# Patient Record
Sex: FEMALE | Race: WHITE | NOT HISPANIC OR LATINO | Employment: OTHER | URBAN - METROPOLITAN AREA
[De-identification: names, ages, dates, MRNs, and addresses within clinical notes are randomized per-mention and may not be internally consistent; named-entity substitution may affect disease eponyms.]

---

## 2017-06-17 ENCOUNTER — RADIANT APPOINTMENT (OUTPATIENT)
Dept: GENERAL RADIOLOGY | Facility: CLINIC | Age: 82
End: 2017-06-17
Attending: PHYSICIAN ASSISTANT
Payer: COMMERCIAL

## 2017-06-17 ENCOUNTER — OFFICE VISIT (OUTPATIENT)
Dept: URGENT CARE | Facility: URGENT CARE | Age: 82
End: 2017-06-17
Payer: COMMERCIAL

## 2017-06-17 VITALS
OXYGEN SATURATION: 97 % | DIASTOLIC BLOOD PRESSURE: 70 MMHG | WEIGHT: 103 LBS | BODY MASS INDEX: 17.68 KG/M2 | SYSTOLIC BLOOD PRESSURE: 118 MMHG | HEART RATE: 82 BPM | TEMPERATURE: 98.3 F

## 2017-06-17 DIAGNOSIS — R05.9 COUGH: ICD-10-CM

## 2017-06-17 DIAGNOSIS — J20.9 ACUTE BRONCHITIS, UNSPECIFIED ORGANISM: Primary | ICD-10-CM

## 2017-06-17 DIAGNOSIS — R07.1 PAINFUL RESPIRATION: ICD-10-CM

## 2017-06-17 LAB
ALBUMIN UR-MCNC: NEGATIVE MG/DL
APPEARANCE UR: CLEAR
BASOPHILS # BLD AUTO: 0 10E9/L (ref 0–0.2)
BASOPHILS NFR BLD AUTO: 0.2 %
BILIRUB UR QL STRIP: NEGATIVE
COLOR UR AUTO: YELLOW
DIFFERENTIAL METHOD BLD: ABNORMAL
EOSINOPHIL # BLD AUTO: 0.1 10E9/L (ref 0–0.7)
EOSINOPHIL NFR BLD AUTO: 1.3 %
ERYTHROCYTE [DISTWIDTH] IN BLOOD BY AUTOMATED COUNT: 16.4 % (ref 10–15)
GLUCOSE UR STRIP-MCNC: NEGATIVE MG/DL
HCT VFR BLD AUTO: 35.7 % (ref 35–47)
HGB BLD-MCNC: 10.7 G/DL (ref 11.7–15.7)
HGB UR QL STRIP: NEGATIVE
KETONES UR STRIP-MCNC: NEGATIVE MG/DL
LEUKOCYTE ESTERASE UR QL STRIP: NEGATIVE
LYMPHOCYTES # BLD AUTO: 2.1 10E9/L (ref 0.8–5.3)
LYMPHOCYTES NFR BLD AUTO: 24.4 %
MCH RBC QN AUTO: 26.8 PG (ref 26.5–33)
MCHC RBC AUTO-ENTMCNC: 30 G/DL (ref 31.5–36.5)
MCV RBC AUTO: 89 FL (ref 78–100)
MONOCYTES # BLD AUTO: 0.9 10E9/L (ref 0–1.3)
MONOCYTES NFR BLD AUTO: 10.9 %
NEUTROPHILS # BLD AUTO: 5.5 10E9/L (ref 1.6–8.3)
NEUTROPHILS NFR BLD AUTO: 63.2 %
NITRATE UR QL: NEGATIVE
NON-SQ EPI CELLS #/AREA URNS LPF: NORMAL /LPF
PH UR STRIP: 5.5 PH (ref 5–7)
PLATELET # BLD AUTO: 440 10E9/L (ref 150–450)
RADIOLOGIST FLAGS: ABNORMAL
RBC # BLD AUTO: 4 10E12/L (ref 3.8–5.2)
RBC #/AREA URNS AUTO: NORMAL /HPF (ref 0–2)
SP GR UR STRIP: 1.02 (ref 1–1.03)
URN SPEC COLLECT METH UR: NORMAL
UROBILINOGEN UR STRIP-ACNC: 0.2 EU/DL (ref 0.2–1)
WBC # BLD AUTO: 8.7 10E9/L (ref 4–11)
WBC #/AREA URNS AUTO: NORMAL /HPF (ref 0–2)

## 2017-06-17 PROCEDURE — 93000 ELECTROCARDIOGRAM COMPLETE: CPT | Performed by: PHYSICIAN ASSISTANT

## 2017-06-17 PROCEDURE — 36415 COLL VENOUS BLD VENIPUNCTURE: CPT | Performed by: PHYSICIAN ASSISTANT

## 2017-06-17 PROCEDURE — 99214 OFFICE O/P EST MOD 30 MIN: CPT | Performed by: PHYSICIAN ASSISTANT

## 2017-06-17 PROCEDURE — 81001 URINALYSIS AUTO W/SCOPE: CPT | Performed by: PHYSICIAN ASSISTANT

## 2017-06-17 PROCEDURE — 85025 COMPLETE CBC W/AUTO DIFF WBC: CPT | Performed by: PHYSICIAN ASSISTANT

## 2017-06-17 PROCEDURE — 71020 XR CHEST 2 VW: CPT

## 2017-06-17 RX ORDER — DOXYCYCLINE 100 MG/1
100 CAPSULE ORAL 2 TIMES DAILY
Qty: 20 CAPSULE | Refills: 0 | Status: SHIPPED | OUTPATIENT
Start: 2017-06-17 | End: 2018-10-16

## 2017-06-17 RX ORDER — BENZONATATE 200 MG/1
200 CAPSULE ORAL 3 TIMES DAILY PRN
Qty: 21 CAPSULE | Refills: 0 | Status: SHIPPED | OUTPATIENT
Start: 2017-06-17 | End: 2017-06-24

## 2017-06-17 NOTE — PROGRESS NOTES
SUBJECTIVE:  Courtney Howe is a 82 year old female who presents to the clinic today with a chief complaint of cough  and pleuritic chest pain for 4 day(s).  Her cough is described as occasional and nonproductive.    The patient's symptoms are moderate and worsening.  Associated symptoms include pleuritic chest pain and fever. The patient's symptoms are exacerbated by lying down and change of position.  Patient has been using Tylenol and Advil 400mg to improve symptoms.    No dizziness, no shortness of breath. No syncope or presyncope. No diaphoresis, nausea vomiting, left arm or jaw pain; no sustained chest pain. No other cardiac symptoms to report    Past Medical History:   Diagnosis Date     Cancer (H) skin cancer    removed     CARDIOVASCULAR SCREENING; LDL GOAL LESS THAN 160 10/31/2010     Celiac disease 9/8/2005     INSOMNIA NEC 9/8/2005     OSTEOPOROSIS NOS 9/8/2005     PNEUMONIA, ORGANISM NOS 12/5/2007       Current Outpatient Prescriptions   Medication Sig Dispense Refill     alendronate (FOSAMAX) 70 MG tablet Take 1 tablet (70 mg) by mouth every 7 days Take with over 8 ounces water and stay upright for at least 30 minutes after dose.  Take at least 60 minutes before breakfast 12 tablet 3     amitriptyline (ELAVIL) 25 MG tablet Take 1 tablet (25 mg) by mouth At Bedtime at bedtime. 90 tablet 3     MULTIVITAL OR TABS 1 TABLET DAILY       ASPIRIN 81 MG OR TABS 1 TABLET DAILY       CALCIUM + D 600-200 MG-UNIT OR TABS 1 TABLET DAILY       Ondansetron HCl (ZOFRAN PO) Take 4 mg by mouth         Social History   Substance Use Topics     Smoking status: Never Smoker     Smokeless tobacco: Never Used     Alcohol use Yes      Comment: social       ROS  Review of systems negative except as stated above.    OBJECTIVE:  /70 (BP Location: Left arm, Patient Position: Chair, Cuff Size: Adult Regular)  Pulse 82  Temp 98.3  F (36.8  C) (Oral)  Wt 103 lb (46.7 kg)  SpO2 97%  BMI 17.68 kg/m2  GENERAL APPEARANCE:  healthy, alert and no distress  EYES: EOMI,  PERRL, conjunctiva clear  HENT: ear canals and TM's normal.  Nose and mouth without ulcers, erythema or lesions  NECK: supple, nontender, no lymphadenopathy  RESP: lungs clear to auscultation - no rales, rhonchi or wheezes  CV: regular rates and rhythm, normal S1 S2, no murmur noted  ABDOMEN:  soft, nontender, no HSM or masses and bowel sounds normal  NEURO: Normal strength and tone, sensory exam grossly normal,  normal speech and mentation  SKIN: no suspicious lesions or rashes    Lab:  Results for orders placed or performed in visit on 06/17/17   XR Chest 2 Views   Result Value Ref Range    Radiologist flags (Urgent)      Opacity in the left mid lung that requires continued    Narrative    CHEST TWO VIEW 6/17/2017 4:07 PM     COMPARISON: None    HISTORY: Cough      Impression    IMPRESSION: There are emphysematous and fibrotic changes throughout  both lungs. There is subtle patchy opacity in the mid left lung that  may represent atelectasis or pneumonia although malignancy cannot be  excluded. Continued surveillance recommended. The lungs are otherwise  clear. There is no pleural effusion or pneumothorax. Heart size is  normal with no evidence for congestive failure.    [Access Center: Opacity in the left mid lung that requires continued  surveillance to exclude malignancy]    This report will be copied to the [facility] to ensure a  provider acknowledges the finding. [facility] is available Monday  through Friday 8am-3:30 pm.     MARISA KAISER MD   CBC with platelets differential   Result Value Ref Range    WBC 8.7 4.0 - 11.0 10e9/L    RBC Count 4.00 3.8 - 5.2 10e12/L    Hemoglobin 10.7 (L) 11.7 - 15.7 g/dL    Hematocrit 35.7 35.0 - 47.0 %    MCV 89 78 - 100 fl    MCH 26.8 26.5 - 33.0 pg    MCHC 30.0 (L) 31.5 - 36.5 g/dL    RDW 16.4 (H) 10.0 - 15.0 %    Platelet Count 440 150 - 450 10e9/L    Diff Method Automated Method     % Neutrophils 63.2 %    %  Lymphocytes 24.4 %    % Monocytes 10.9 %    % Eosinophils 1.3 %    % Basophils 0.2 %    Absolute Neutrophil 5.5 1.6 - 8.3 10e9/L    Absolute Lymphocytes 2.1 0.8 - 5.3 10e9/L    Absolute Monocytes 0.9 0.0 - 1.3 10e9/L    Absolute Eosinophils 0.1 0.0 - 0.7 10e9/L    Absolute Basophils 0.0 0.0 - 0.2 10e9/L   UA with Microscopic   Result Value Ref Range    Color Urine Yellow     Appearance Urine Clear     Glucose Urine Negative NEG mg/dL    Bilirubin Urine Negative NEG    Ketones Urine Negative NEG mg/dL    Specific Gravity Urine 1.020 1.003 - 1.035    pH Urine 5.5 5.0 - 7.0 pH    Protein Albumin Urine Negative NEG mg/dL    Urobilinogen Urine 0.2 0.2 - 1.0 EU/dL    Nitrite Urine Negative NEG    Blood Urine Negative NEG    Leukocyte Esterase Urine Negative NEG    Source Midstream Urine     WBC Urine O - 2 0 - 2 /HPF    RBC Urine O - 2 0 - 2 /HPF    Squamous Epithelial /LPF Urine Few FEW /LPF     EKG:  EKG is with normal sinus rhythm, normal GA, QRS interval, normal axis and transition with transition at lead V4.  PAC noted in lead II Summary is a normal EKG.  Cardiologist will over-read EKG.      ASSESSMENT:    Pneumonitis  Questionable finding on Chest xray requiring future monitoring    PLAN:    Symptomatic measures encouraged, humidified air, plenty of fluids.  Antibiotic as written below for atypical coverage.  Will recheck with primary care provider on Tuesday or Wednesday of next week to follow up on progress of antibiotic coverage and symptoms and review of radiology finding and to schedule follow up monitoring of radiology findings on lung extra.    - doxycycline (VIBRAMYCIN) 100 MG capsule; Take 1 capsule (100 mg) by mouth 2 times daily  Dispense: 20 capsule; Refill: 0    - benzonatate (TESSALON) 200 MG capsule; Take 1 capsule (200 mg) by mouth 3 times daily as needed for cough  Dispense: 21 capsule; Refill: 0

## 2017-06-17 NOTE — NURSING NOTE
"Chief Complaint   Patient presents with     Cough     cough for 10 days,chest pain with deep resp and moving around,had chills last night     Chest Pain       Initial /70 (BP Location: Left arm, Patient Position: Chair, Cuff Size: Adult Regular)  Pulse 82  Temp 98.3  F (36.8  C) (Oral)  Wt 103 lb (46.7 kg)  SpO2 97%  BMI 17.68 kg/m2 Estimated body mass index is 17.68 kg/(m^2) as calculated from the following:    Height as of 10/12/16: 5' 4\" (1.626 m).    Weight as of this encounter: 103 lb (46.7 kg).  Medication Reconciliation: complete   Edson FISCHER    "

## 2017-06-17 NOTE — MR AVS SNAPSHOT
After Visit Summary   6/17/2017    Courtney Howe    MRN: [id number]           Patient Information     Date Of Birth          8/27/1934        Visit Information        Provider Department      6/17/2017 3:20 PM Attila Samuel PA-C [facility]        Today's Diagnoses     Acute bronchitis, unspecified organism    -  1    Cough        Painful respiration           Follow-ups after your visit        Your next 10 appointments already scheduled     Jun 22, 2017  7:40 AM CDT   MyChart Short with Bassem Howe MD   [facility]    [address] [address] [phone]              Who to contact     If you have questions or need follow up information about today's clinic visit or your schedule please contact [facility] directly at [phone].  Normal or non-critical lab and imaging results will be communicated to you by MyChart, letter or phone within 4 business days after the clinic has received the results. If you do not hear from us within 7 days, please contact the clinic through [employer] or phone. If you have a critical or abnormal lab result, we will notify you by phone as soon as possible.  Submit refill requests through [employer] or call your pharmacy and they will forward the refill request to us. Please allow 3 business days for your refill to be completed.          Additional Information About Your Visit        MyChart Information     [employer] gives you secure access to your electronic health record. If you see a primary care provider, you can also send messages to your care team and make appointments. If you have questions, please call your primary care clinic.  If you do not have a primary care provider, please call [phone] and they will assist you.        Care EveryWhere ID     This is your Care EveryWhere ID. This could be used by other  organizations to access your [place] medical records  [id number]        Your Vitals Were     Pulse Temperature Pulse Oximetry BMI (Body Mass Index)          82 98.3  F (36.8  C) (Oral) 97% 17.68 kg/m2         Blood Pressure from Last 3 Encounters:   06/17/17 118/70   10/12/16 112/68   09/10/15 96/58    Weight from Last 3 Encounters:   06/17/17 103 lb (46.7 kg)   10/12/16 100 lb 11.2 oz (45.7 kg)   09/10/15 94 lb 8 oz (42.9 kg)              We Performed the Following     CBC with platelets differential     EKG 12-lead complete w/read - Clinics     UA with Microscopic     XR Chest 2 Views          Today's Medication Changes          These changes are accurate as of: 6/17/17  5:59 PM.  If you have any questions, ask your nurse or doctor.               Start taking these medicines.        Dose/Directions    benzonatate 200 MG capsule   Commonly known as:  TESSALON   Used for:  Acute bronchitis, unspecified organism   Started by:  Attila Samuel PA-C        Dose:  200 mg   Take 1 capsule (200 mg) by mouth 3 times daily as needed for cough   Quantity:  21 capsule   Refills:  0       doxycycline 100 MG capsule   Commonly known as:  VIBRAMYCIN   Used for:  Painful respiration   Started by:  Attila Samuel PA-C        Dose:  100 mg   Take 1 capsule (100 mg) by mouth 2 times daily   Quantity:  20 capsule   Refills:  0            Where to get your medicines      These medications were sent to [id number] - [facility] [address]  [address] [facility] [address]     Phone:  [phone]     benzonatate 200 MG capsule    doxycycline 100 MG capsule                Primary Care Provider Office Phone # Fax #    Bassem Howe [phone]       [facility] [address]  [facility] [address]        Thank you!     Thank you for choosing [facility]  for your care. Our goal is always to provide you with excellent care. Hearing back  from our patients is one way we can continue to improve our services. Please take a few minutes to complete the written survey that you may receive in the mail after your visit with us. Thank you!             Your Updated Medication List - Protect others around you: Learn how to safely use, store and throw away your medicines at www.disposemymeds.org.          This list is accurate as of: 6/17/17  5:59 PM.  Always use your most recent med list.                   Brand Name Dispense Instructions for use    alendronate 70 MG tablet    FOSAMAX    12 tablet    Take 1 tablet (70 mg) by mouth every 7 days Take with over 8 ounces water and stay upright for at least 30 minutes after dose.  Take at least 60 minutes before breakfast       amitriptyline 25 MG tablet    ELAVIL    90 tablet    Take 1 tablet (25 mg) by mouth At Bedtime at bedtime.       aspirin 81 MG tablet      1 TABLET DAILY       benzonatate 200 MG capsule    TESSALON    21 capsule    Take 1 capsule (200 mg) by mouth 3 times daily as needed for cough       calcium + D 600-200 MG-UNIT Tabs   Generic drug:  calcium carbonate-vitamin D      1 TABLET DAILY       doxycycline 100 MG capsule    VIBRAMYCIN    20 capsule    Take 1 capsule (100 mg) by mouth 2 times daily       MULTIVITAL Tabs      1 TABLET DAILY       ZOFRAN PO      Take 4 mg by mouth

## 2017-06-22 ENCOUNTER — OFFICE VISIT (OUTPATIENT)
Dept: INTERNAL MEDICINE | Facility: CLINIC | Age: 82
End: 2017-06-22
Payer: COMMERCIAL

## 2017-06-22 VITALS
WEIGHT: 103.6 LBS | DIASTOLIC BLOOD PRESSURE: 68 MMHG | BODY MASS INDEX: 17.69 KG/M2 | TEMPERATURE: 98 F | HEIGHT: 64 IN | OXYGEN SATURATION: 99 % | SYSTOLIC BLOOD PRESSURE: 108 MMHG | HEART RATE: 80 BPM

## 2017-06-22 DIAGNOSIS — J98.4 PNEUMONITIS: Primary | ICD-10-CM

## 2017-06-22 PROCEDURE — 99213 OFFICE O/P EST LOW 20 MIN: CPT | Performed by: INTERNAL MEDICINE

## 2017-06-22 NOTE — MR AVS SNAPSHOT
After Visit Summary   6/22/2017    Courtney Howe    MRN: [id number]           Patient Information     Date Of Birth          8/27/1934        Visit Information        Provider Department      6/22/2017 7:40 AM Bassem Howe MD [facility]        Today's Diagnoses     Pneumonitis    -  1       Follow-ups after your visit        Future tests that were ordered for you today     Open Future Orders        Priority Expected Expires Ordered    XR Chest 2 Views Routine 7/4/2017 7/31/2017 6/22/2017            Who to contact     If you have questions or need follow up information about today's clinic visit or your schedule please contact [facility] directly at [phone].  Normal or non-critical lab and imaging results will be communicated to you by MyChart, letter or phone within 4 business days after the clinic has received the results. If you do not hear from us within 7 days, please contact the clinic through [employer] or phone. If you have a critical or abnormal lab result, we will notify you by phone as soon as possible.  Submit refill requests through [employer] or call your pharmacy and they will forward the refill request to us. Please allow 3 business days for your refill to be completed.          Additional Information About Your Visit        MyChart Information     [employer] gives you secure access to your electronic health record. If you see a primary care provider, you can also send messages to your care team and make appointments. If you have questions, please call your primary care clinic.  If you do not have a primary care provider, please call [phone] and they will assist you.        Care EveryWhere ID     This is your Care EveryWhere ID. This could be used by other organizations to access your [place] medical records  [id number]        Your Vitals Were     Pulse Temperature Height Pulse Oximetry BMI (Body Mass Index)       80 98  F  "(36.7  C) (Oral) 5' 4\" (1.626 m) 99% 17.78 kg/m2        Blood Pressure from Last 3 Encounters:   06/22/17 108/68   06/17/17 118/70   10/12/16 112/68    Weight from Last 3 Encounters:   06/22/17 103 lb 9.6 oz (47 kg)   06/17/17 103 lb (46.7 kg)   10/12/16 100 lb 11.2 oz (45.7 kg)               Primary Care Provider Office Phone # Fax #    Bassem Howe [phone]       [facility] [address] [facility] [address]        Equal Access to Services     MARY HAUSER : Hadii jessi sorto hadasho Soomaali, waaxda luqadaha, qaybta kaalmada adeegyada, waxmagnolia weldon. So [facility] [phone].    ATENCIÓN: Si habla español, tiene a beard disposición servicios gratuitos de asistencia lingüística. Ll[facility] [phone].    We comply with applicable federal civil rights laws and [place] laws. We do not discriminate on the basis of race, color, national origin, age, disability sex, sexual orientation or gender identity.            Thank you!     Thank you for choosing [facility]  for your care. Our goal is always to provide you with excellent care. Hearing back from our patients is one way we can continue to improve our services. Please take a few minutes to complete the written survey that you may receive in the mail after your visit with us. Thank you!             Your Updated Medication List - Protect others around you: Learn how to safely use, store and throw away your medicines at www.disposemymeds.org.          This list is accurate as of: 6/22/17  7:54 AM.  Always use your most recent med list.                   Brand Name Dispense Instructions for use Diagnosis    alendronate 70 MG tablet    FOSAMAX    12 tablet    Take 1 tablet (70 mg) by mouth every 7 days Take with over 8 ounces water and stay upright for at least 30 minutes after dose.  Take at least 60 minutes before breakfast    Osteoporosis       amitriptyline 25 MG tablet    ELAVIL    " 90 tablet    Take 1 tablet (25 mg) by mouth At Bedtime at bedtime.    Insomnia, unspecified       aspirin 81 MG tablet      1 TABLET DAILY        benzonatate 200 MG capsule    TESSALON    21 capsule    Take 1 capsule (200 mg) by mouth 3 times daily as needed for cough    Acute bronchitis, unspecified organism       calcium + D 600-200 MG-UNIT Tabs   Generic drug:  calcium carbonate-vitamin D      1 TABLET DAILY        doxycycline 100 MG capsule    VIBRAMYCIN    20 capsule    Take 1 capsule (100 mg) by mouth 2 times daily    Painful respiration       MULTIVITAL Tabs      1 TABLET DAILY

## 2017-06-22 NOTE — NURSING NOTE
"Chief Complaint   Patient presents with     Cough       Initial /68  Pulse 80  Temp 98  F (36.7  C) (Oral)  Ht 5' 4\" (1.626 m)  Wt 103 lb 9.6 oz (47 kg)  SpO2 99%  BMI 17.78 kg/m2 Estimated body mass index is 17.78 kg/(m^2) as calculated from the following:    Height as of this encounter: 5' 4\" (1.626 m).    Weight as of this encounter: 103 lb 9.6 oz (47 kg).  Medication Reconciliation: complete   Shakila Billy, RENETTA      "

## 2017-06-22 NOTE — PROGRESS NOTES
SUBJECTIVE:                                                    Courtney Howe is a 82 year old female who presents to clinic today for the following health issues:    ED/UC Followup:    Facility:  [facility]   Date of visit: 6/17/17  Reason for visit: Acute bronchitis, cough  Current Status: Improving, still has dry cough.      Reviewed results with patient as per CXR.  On abx,feeling better but not fully with 5+ days of abx left.  No fevers.    Problem list and histories reviewed & adjusted, as indicated.  Additional history: as documented    Patient Active Problem List   Diagnosis     Osteoporosis     Celiac disease     Insomnia     Pneumonia, organism unspecified     CARDIOVASCULAR SCREENING; LDL GOAL LESS THAN 160     Advance care planning     Palpitations     C. difficile diarrhea     Past Surgical History:   Procedure Laterality Date     BIOPSY       BREAST SURGERY       COLONOSCOPY       GI SURGERY       HYSTERECTOMY, CERVIX STATUS UNKNOWN       HYSTERECTOMY, PAP NO LONGER INDICATED         Social History   Substance Use Topics     Smoking status: Never Smoker     Smokeless tobacco: Never Used     Alcohol use Yes      Comment: social     Family History   Problem Relation Age of Onset     CEREBROVASCULAR DISEASE Father      OSTEOPOROSIS Mother          Current Outpatient Prescriptions   Medication Sig Dispense Refill     doxycycline (VIBRAMYCIN) 100 MG capsule Take 1 capsule (100 mg) by mouth 2 times daily 20 capsule 0     benzonatate (TESSALON) 200 MG capsule Take 1 capsule (200 mg) by mouth 3 times daily as needed for cough 21 capsule 0     alendronate (FOSAMAX) 70 MG tablet Take 1 tablet (70 mg) by mouth every 7 days Take with over 8 ounces water and stay upright for at least 30 minutes after dose.  Take at least 60 minutes before breakfast 12 tablet 3     amitriptyline (ELAVIL) 25 MG tablet Take 1 tablet (25 mg) by mouth At Bedtime at bedtime. 90 tablet 3     Ondansetron HCl (ZOFRAN PO) Take 4 mg by  "mouth       MULTIVITAL OR TABS 1 TABLET DAILY       ASPIRIN 81 MG OR TABS 1 TABLET DAILY       CALCIUM + D 600-200 MG-UNIT OR TABS 1 TABLET DAILY       Allergies   Allergen Reactions     Wheat Gluten [Gluten Meal]      BP Readings from Last 3 Encounters:   06/17/17 118/70   10/12/16 112/68   09/10/15 96/58    Wt Readings from Last 3 Encounters:   06/17/17 103 lb (46.7 kg)   10/12/16 100 lb 11.2 oz (45.7 kg)   09/10/15 94 lb 8 oz (42.9 kg)            Labs reviewed in EPIC    Reviewed and updated as needed this visit by clinical staff       Reviewed and updated as needed this visit by Provider         ROS:  C: NEGATIVE for fever, chills, change in weight  E/M: NEGATIVE for ear, mouth and throat problems  CV: NEGATIVE for chest pain, palpitations or peripheral edema  GI: NEGATIVE for nausea, abdominal pain, heartburn, or change in bowel habits  : NEGATIVE for frequency, dysuria, or hematuria  M: NEGATIVE for significant arthralgias or myalgia  H: NEGATIVE for bleeding problems  P: NEGATIVE for changes in mood or affect    OBJECTIVE:                                                    /68  Pulse 80  Temp 98  F (36.7  C) (Oral)  Ht 5' 4\" (1.626 m)  Wt 103 lb 9.6 oz (47 kg)  SpO2 99%  BMI 17.78 kg/m2  Body mass index is 17.78 kg/(m^2).  GENERAL: alert and no distress  RESP: lungs clear to auscultation - no rales, no rhonchi, no wheezes  CV: regular rates and rhythm, normal S1 S2, no S3 or S4 and no click or rub -  MS: extremities- no gross deformities noted  NEURO: no focal changes  PSYCH: Alert and oriented times 3; speech- coherent , normal rate and volume; able to articulate logical thoughts, able to abstract reason, no tangential thoughts, no hallucinations or delusions, affect- normal       ASSESSMENT/PLAN:                                                      (J18.9) Pneumonitis  (primary encounter diagnosis)  Comment: complete abx and repeat CXR after 7/4/17, consider CT imaging pending results  Plan: " XR Chest 2 Views          See Patient Instructions    Bassem Howe MD  [facility]    15 minutes spent with this patient, face to face, discussing treatment options for listed problems above as well as side effects of appropriate medications.  Counseling time extended beyond 50% of the clinic visit.  Medication dosing, treatment plan and follow-up were discussed. Also reviewed need for primary care testing for patient.

## 2017-07-11 ENCOUNTER — MYC MEDICAL ADVICE (OUTPATIENT)
Dept: INTERNAL MEDICINE | Facility: CLINIC | Age: 82
End: 2017-07-11

## 2017-07-11 ENCOUNTER — RADIANT APPOINTMENT (OUTPATIENT)
Dept: GENERAL RADIOLOGY | Facility: CLINIC | Age: 82
End: 2017-07-11
Attending: INTERNAL MEDICINE
Payer: COMMERCIAL

## 2017-07-11 DIAGNOSIS — J98.4 PNEUMONITIS: Primary | ICD-10-CM

## 2017-07-11 DIAGNOSIS — J98.4 PNEUMONITIS: ICD-10-CM

## 2017-07-11 PROCEDURE — 71020 XR CHEST 2 VW: CPT

## 2017-07-13 ENCOUNTER — MYC MEDICAL ADVICE (OUTPATIENT)
Dept: INTERNAL MEDICINE | Facility: CLINIC | Age: 82
End: 2017-07-13

## 2017-07-13 DIAGNOSIS — Z78.0 ASYMPTOMATIC POSTMENOPAUSAL STATUS: Primary | ICD-10-CM

## 2017-08-30 ENCOUNTER — RADIANT APPOINTMENT (OUTPATIENT)
Dept: GENERAL RADIOLOGY | Facility: CLINIC | Age: 82
End: 2017-08-30
Attending: INTERNAL MEDICINE
Payer: COMMERCIAL

## 2017-08-30 ENCOUNTER — RADIANT APPOINTMENT (OUTPATIENT)
Dept: MAMMOGRAPHY | Facility: CLINIC | Age: 82
End: 2017-08-30
Attending: INTERNAL MEDICINE
Payer: COMMERCIAL

## 2017-08-30 ENCOUNTER — RADIANT APPOINTMENT (OUTPATIENT)
Dept: BONE DENSITY | Facility: CLINIC | Age: 82
End: 2017-08-30
Attending: INTERNAL MEDICINE
Payer: COMMERCIAL

## 2017-08-30 DIAGNOSIS — Z12.31 VISIT FOR SCREENING MAMMOGRAM: ICD-10-CM

## 2017-08-30 DIAGNOSIS — Z78.0 ASYMPTOMATIC POSTMENOPAUSAL STATUS: ICD-10-CM

## 2017-08-30 PROCEDURE — G0202 SCR MAMMO BI INCL CAD: HCPCS | Mod: TC

## 2017-08-30 PROCEDURE — 77085 DXA BONE DENSITY AXL VRT FX: CPT | Performed by: INTERNAL MEDICINE

## 2017-08-30 PROCEDURE — 71020 XR CHEST 2 VW: CPT

## 2017-09-13 ENCOUNTER — MYC MEDICAL ADVICE (OUTPATIENT)
Dept: INTERNAL MEDICINE | Facility: CLINIC | Age: 82
End: 2017-09-13

## 2017-09-23 ENCOUNTER — HEALTH MAINTENANCE LETTER (OUTPATIENT)
Age: 82
End: 2017-09-23

## 2017-09-27 DIAGNOSIS — M81.0 OSTEOPOROSIS: ICD-10-CM

## 2017-09-27 NOTE — TELEPHONE ENCOUNTER
alendronate (FOSAMAX) 70 MG tablet    Last Written Prescription Date: 10/12/2016  Last Fill Quantity: 12, # refills: 3  Last Office Visit with G, [facility] or [facility] prescribing provider: 6/22/2017       DEXA Scan:  Last order of DX HIP/PELVIS/SPINE was found on 7/13/2011 from Orders Only on 7/13/2011     Last order of DX HIP/PELVIS/SPINE W LAT FRACTION ANALYSIS was found on 8/30/2017 from Radiant Appointment on 8/30/2017       Creatinine   Date Value Ref Range Status   12/07/2016 0.54 0.52 - 1.04 mg/dL Final

## 2017-09-28 RX ORDER — ALENDRONATE SODIUM 70 MG/1
TABLET ORAL
Qty: 12 TABLET | Refills: 2 | Status: SHIPPED | OUTPATIENT
Start: 2017-09-28 | End: 2018-06-13

## 2017-11-07 DIAGNOSIS — G47.00 INSOMNIA: ICD-10-CM

## 2018-03-19 ENCOUNTER — TRANSFERRED RECORDS (OUTPATIENT)
Dept: HEALTH INFORMATION MANAGEMENT | Facility: CLINIC | Age: 83
End: 2018-03-19

## 2018-06-13 PROBLEM — M81.0 OSTEOPOROSIS, UNSPECIFIED OSTEOPOROSIS TYPE, UNSPECIFIED PATHOLOGICAL FRACTURE PRESENCE: Status: ACTIVE | Noted: 2018-06-13

## 2018-09-12 DIAGNOSIS — G47.00 INSOMNIA: ICD-10-CM

## 2018-09-12 DIAGNOSIS — M81.0 OSTEOPOROSIS, UNSPECIFIED OSTEOPOROSIS TYPE, UNSPECIFIED PATHOLOGICAL FRACTURE PRESENCE: ICD-10-CM

## 2018-09-12 NOTE — LETTER
[facility]  [address] [id number] [phone]            Courtney Howe  [address]  [facility] [address]        September 13, 2018    Dear Courtney,    While refilling your prescription today, we noticed that you are due for an appointment with your provider.  We will refill your prescription for 30 days, but a follow-up appointment must be made before any additional refills can be approved.     Taking care of your health is important to us and we look forward to seeing you in the near future.  Please call us at [phone] or [phone] (or use "[employer]") to schedule an appointment.     Please disregard this notice if you have already made an appointment.    Sincerely,        [facility]

## 2018-09-12 NOTE — TELEPHONE ENCOUNTER
"Requested Prescriptions   Pending Prescriptions Disp Refills     alendronate (FOSAMAX) 70 MG tablet [Pharmacy Med Name: Alendronate Sodium Oral Tablet 70 MG]  Last Written Prescription Date:  06/13/2018  Last Fill Quantity: 12,  # refills:0    Last Office Visit: 6/22/2017   Future Office Visit:      12 tablet 0     Sig: TAKE 1 TABLET BY MOUTH EVERY 7 DAYS. TAKE WITH OVER 8 OUNCES OF WATER AND STAY UPRIGHT FOR AT LEAST 30 MINUTES AFTER DOSE. TAKE AT LEAST 60    Bisphosphonates Failed    9/12/2018  4:19 PM       Failed - Recent (12 mo) or future (30 days) visit within the authorizing provider's specialty    Patient had office visit in the last 12 months or has a visit in the next 30 days with authorizing provider or within the authorizing provider's specialty.  See \"Patient Info\" tab in inbasket, or \"Choose Columns\" in Meds & Orders section of the refill encounter.           Failed - Normal serum creatinine on file within past 12 months    Recent Labs   Lab Test  12/07/16   1657   CR  0.54            Passed - Dexa on file within past 2 years    Please review last Dexa result.          Passed - Patient is age 18 or older        amitriptyline (ELAVIL) 25 MG tablet [Pharmacy Med Name: Amitriptyline HCl Oral Tablet 25 MG]  Last Written Prescription Date:  11/08/2017  Last Fill Quantity: 90,  # refills: 01   Last Office Visit: 6/22/2017   Future Office Visit:      90 tablet 0     Sig: TAKE ONE TABLET BY MOUTH AT BEDTIME    Tricyclic Agents ( Annual appt and no PHQ9) Failed    9/12/2018  4:19 PM       Failed - Blood Pressure under 140/90 in past 12 mos    BP Readings from Last 3 Encounters:   06/22/17 108/68   06/17/17 118/70   10/12/16 112/68                Failed - Recent (12 mo) or future (30 days) visit within authorizing provider's specialty    Patient had office visit in the last 12 months or has a visit in the next 30 days with authorizing provider or within the authorizing provider's specialty.  See \"Patient Info\" " "tab in inbasket, or \"Choose Columns\" in Meds & Orders section of the refill encounter.           Passed - Patient is age 18 or older       Passed - Patient is not pregnant       Passed - No positive pregnancy test on record in past 12 mos          "

## 2018-09-13 RX ORDER — ALENDRONATE SODIUM 70 MG/1
TABLET ORAL
Qty: 4 TABLET | Refills: 0 | Status: SHIPPED | OUTPATIENT
Start: 2018-09-13 | End: 2018-10-16

## 2018-09-25 ENCOUNTER — TRANSFERRED RECORDS (OUTPATIENT)
Dept: HEALTH INFORMATION MANAGEMENT | Facility: CLINIC | Age: 83
End: 2018-09-25

## 2018-10-16 ENCOUNTER — OFFICE VISIT (OUTPATIENT)
Dept: INTERNAL MEDICINE | Facility: CLINIC | Age: 83
End: 2018-10-16
Payer: COMMERCIAL

## 2018-10-16 VITALS
HEIGHT: 64 IN | SYSTOLIC BLOOD PRESSURE: 112 MMHG | BODY MASS INDEX: 17.79 KG/M2 | RESPIRATION RATE: 14 BRPM | HEART RATE: 78 BPM | OXYGEN SATURATION: 99 % | TEMPERATURE: 97.6 F | DIASTOLIC BLOOD PRESSURE: 68 MMHG | WEIGHT: 104.2 LBS

## 2018-10-16 DIAGNOSIS — G47.00 INSOMNIA, UNSPECIFIED TYPE: ICD-10-CM

## 2018-10-16 DIAGNOSIS — Z23 NEED FOR PROPHYLACTIC VACCINATION AND INOCULATION AGAINST INFLUENZA: ICD-10-CM

## 2018-10-16 DIAGNOSIS — Z23 NEED FOR PROPHYLACTIC VACCINATION WITH TETANUS-DIPHTHERIA (TD): ICD-10-CM

## 2018-10-16 DIAGNOSIS — M81.0 OSTEOPOROSIS, UNSPECIFIED OSTEOPOROSIS TYPE, UNSPECIFIED PATHOLOGICAL FRACTURE PRESENCE: Primary | ICD-10-CM

## 2018-10-16 LAB
ALBUMIN SERPL-MCNC: 3.1 G/DL (ref 3.4–5)
ALP SERPL-CCNC: 71 U/L (ref 40–150)
ALT SERPL W P-5'-P-CCNC: 26 U/L (ref 0–50)
ANION GAP SERPL CALCULATED.3IONS-SCNC: 8 MMOL/L (ref 3–14)
AST SERPL W P-5'-P-CCNC: 32 U/L (ref 0–45)
BILIRUB SERPL-MCNC: 0.4 MG/DL (ref 0.2–1.3)
BUN SERPL-MCNC: 8 MG/DL (ref 7–30)
CALCIUM SERPL-MCNC: 8.9 MG/DL (ref 8.5–10.1)
CHLORIDE SERPL-SCNC: 106 MMOL/L (ref 94–109)
CO2 SERPL-SCNC: 25 MMOL/L (ref 20–32)
CREAT SERPL-MCNC: 0.5 MG/DL (ref 0.52–1.04)
GFR SERPL CREATININE-BSD FRML MDRD: >90 ML/MIN/1.7M2
GLUCOSE SERPL-MCNC: 86 MG/DL (ref 70–99)
POTASSIUM SERPL-SCNC: 4 MMOL/L (ref 3.4–5.3)
PROT SERPL-MCNC: 7.1 G/DL (ref 6.8–8.8)
SODIUM SERPL-SCNC: 139 MMOL/L (ref 133–144)

## 2018-10-16 PROCEDURE — 80053 COMPREHEN METABOLIC PANEL: CPT | Performed by: INTERNAL MEDICINE

## 2018-10-16 PROCEDURE — 36415 COLL VENOUS BLD VENIPUNCTURE: CPT | Performed by: INTERNAL MEDICINE

## 2018-10-16 PROCEDURE — 99214 OFFICE O/P EST MOD 30 MIN: CPT | Mod: 25 | Performed by: INTERNAL MEDICINE

## 2018-10-16 PROCEDURE — 90471 IMMUNIZATION ADMIN: CPT | Performed by: INTERNAL MEDICINE

## 2018-10-16 PROCEDURE — 90714 TD VACC NO PRESV 7 YRS+ IM: CPT | Performed by: INTERNAL MEDICINE

## 2018-10-16 RX ORDER — ALENDRONATE SODIUM 70 MG/1
70 TABLET ORAL
Qty: 12 TABLET | Refills: 3 | Status: SHIPPED | OUTPATIENT
Start: 2018-10-16 | End: 2019-09-09

## 2018-10-16 NOTE — MR AVS SNAPSHOT
After Visit Summary   10/16/2018    Courtney Howe    MRN: [id number]           Patient Information     Date Of Birth          8/27/1934        Visit Information        Provider Department      10/16/2018 8:40 AM Bassem Howe MD [facility]        Today's Diagnoses     Osteoporosis, unspecified osteoporosis type, unspecified pathological fracture presence    -  1    Insomnia, unspecified type        Need for prophylactic vaccination and inoculation against influenza        Need for prophylactic vaccination with tetanus-diphtheria (Td)           Follow-ups after your visit        Who to contact     If you have questions or need follow up information about today's clinic visit or your schedule please contact [facility] directly at [phone].  Normal or non-critical lab and imaging results will be communicated to you by MyChart, letter or phone within 4 business days after the clinic has received the results. If you do not hear from us within 7 days, please contact the clinic through [facility] or phone. If you have a critical or abnormal lab result, we will notify you by phone as soon as possible.  Submit refill requests through [employer] or call your pharmacy and they will forward the refill request to us. Please allow 3 business days for your refill to be completed.          Additional Information About Your Visit        MyChart Information     [employer] gives you secure access to your electronic health record. If you see a primary care provider, you can also send messages to your care team and make appointments. If you have questions, please call your primary care clinic.  If you do not have a primary care provider, please call [phone] and they will assist you.        Care EveryWhere ID     This is your Care EveryWhere ID. This could be used by other organizations to access your [facility] medical records  [id number]        Your Vitals Were      "Pulse Temperature Respirations Height Pulse Oximetry BMI (Body Mass Index)    78 97.6  F (36.4  C) (Oral) 14 5' 4\" (1.626 m) 99% 17.89 kg/m2       Blood Pressure from Last 3 Encounters:   10/16/18 112/68   06/22/17 108/68   06/17/17 118/70    Weight from Last 3 Encounters:   10/16/18 104 lb 3.2 oz (47.3 kg)   06/22/17 103 lb 9.6 oz (47 kg)   06/17/17 103 lb (46.7 kg)              We Performed the Following     ADMIN 1st VACCINE     Comprehensive metabolic panel     TD (ADULT, 7+) PRESERVE FREE          Today's Medication Changes          These changes are accurate as of 10/16/18  8:59 AM.  If you have any questions, ask your nurse or doctor.               These medicines have changed or have updated prescriptions.        Dose/Directions    alendronate 70 MG tablet   Commonly known as:  FOSAMAX   This may have changed:  See the new instructions.   Used for:  Osteoporosis, unspecified osteoporosis type, unspecified pathological fracture presence   Changed by:  Bassem Howe MD        Dose:  70 mg   Take 1 tablet (70 mg) by mouth every 7 days   Quantity:  12 tablet   Refills:  3       amitriptyline 25 MG tablet   Commonly known as:  ELAVIL   This may have changed:  See the new instructions.   Used for:  Insomnia, unspecified type   Changed by:  Bassem Howe MD        Dose:  25 mg   Take 1 tablet (25 mg) by mouth At Bedtime   Quantity:  90 tablet   Refills:  3            Where to get your medicines      These medications were sent to [id number] - [facility] [address]  [address] [address]     Phone:  [phone]     alendronate 70 MG tablet    amitriptyline 25 MG tablet                Primary Care Provider Office Phone # Fax #    Bassem Howe [phone]       [address] [facility] [address]        Equal Access to Services     MARY HAUSER AH: Hadii jessi sorto hadasho Soomaali, waaxda luqadaha, qaybta kaalmada adeegyada, naren mcgrathin " rhonda bhupinderrenato joetravon ladiana ah. So [facility] [phone].    ATENCIÓN: Si habla haydee, tiene a beard disposición servicios gratuitos de asistencia lingüística. Cris sheridan [phone].    We comply with applicable federal civil rights laws and [place] laws. We do not discriminate on the basis of race, color, national origin, age, disability, sex, sexual orientation, or gender identity.            Thank you!     Thank you for choosing [facility]  for your care. Our goal is always to provide you with excellent care. Hearing back from our patients is one way we can continue to improve our services. Please take a few minutes to complete the written survey that you may receive in the mail after your visit with us. Thank you!             Your Updated Medication List - Protect others around you: Learn how to safely use, store and throw away your medicines at www.disposemymeds.org.          This list is accurate as of 10/16/18  8:59 AM.  Always use your most recent med list.                   Brand Name Dispense Instructions for use Diagnosis    alendronate 70 MG tablet    FOSAMAX    12 tablet    Take 1 tablet (70 mg) by mouth every 7 days    Osteoporosis, unspecified osteoporosis type, unspecified pathological fracture presence       amitriptyline 25 MG tablet    ELAVIL    90 tablet    Take 1 tablet (25 mg) by mouth At Bedtime    Insomnia, unspecified type       aspirin 81 MG tablet      1 TABLET DAILY        calcium + D 600-200 MG-UNIT Tabs   Generic drug:  calcium carbonate-vitamin D      1 TABLET DAILY        MULTIVITAL Tabs      1 TABLET DAILY

## 2018-10-16 NOTE — PROGRESS NOTES
SUBJECTIVE:   Courtney Howe is a 84 year old female who presents to clinic today for the following health issues:      Medication Followup     Taking Medication as prescribed: yes    Side Effects:  None    Medication Helping Symptoms:  yes     She offers no real complaints or concerns at present time and is here today for simple medication refill..    Problem list and histories reviewed & adjusted, as indicated.  Additional history: as documented    Patient Active Problem List   Diagnosis     Celiac disease     Insomnia     Pneumonitis     CARDIOVASCULAR SCREENING; LDL GOAL LESS THAN 160     Advance care planning     Palpitations     C. difficile diarrhea     Osteoporosis, unspecified osteoporosis type, unspecified pathological fracture presence     Past Surgical History:   Procedure Laterality Date     BIOPSY       BREAST SURGERY       COLONOSCOPY       GI SURGERY       HYSTERECTOMY, CERVIX STATUS UNKNOWN       HYSTERECTOMY, PAP NO LONGER INDICATED         Social History   Substance Use Topics     Smoking status: Never Smoker     Smokeless tobacco: Never Used     Alcohol use Yes      Comment: social     Family History   Problem Relation Age of Onset     Osteoporosis Mother      Cerebrovascular Disease Father          Current Outpatient Prescriptions   Medication Sig Dispense Refill     alendronate (FOSAMAX) 70 MG tablet TAKE 1 TABLET BY MOUTH EVERY 7 DAYS. TAKE WITH OVER 8 OUNCES OF WATER AND STAY UPRIGHT FOR AT LEAST 30 MINUTES AFTER DOSE. TAKE AT LEAST 60  4 tablet 0     amitriptyline (ELAVIL) 25 MG tablet TAKE ONE TABLET BY MOUTH AT BEDTIME  30 tablet 0     ASPIRIN 81 MG OR TABS 1 TABLET DAILY       CALCIUM + D 600-200 MG-UNIT OR TABS 1 TABLET DAILY       MULTIVITAL OR TABS 1 TABLET DAILY       Allergies   Allergen Reactions     Wheat Gluten [Gluten Meal] Diarrhea     BP Readings from Last 3 Encounters:   10/16/18 112/68   06/22/17 108/68   06/17/17 118/70    Wt Readings from Last 3 Encounters:   10/16/18  "104 lb 3.2 oz (47.3 kg)   06/22/17 103 lb 9.6 oz (47 kg)   06/17/17 103 lb (46.7 kg)               Reviewed and updated as needed this visit by clinical staff  Tobacco  Allergies  Meds  Med Hx  Surg Hx  Fam Hx  Soc Hx      Reviewed and updated as needed this visit by Provider         ROS:  CONSTITUTIONAL: NEGATIVE for fever, chills, change in weight  INTEGUMENTARY/SKIN: NEGATIVE for worrisome rashes, moles or lesions  ENT/MOUTH: NEGATIVE for ear, mouth and throat problems  RESP: NEGATIVE for significant cough or SOB  CV: NEGATIVE for chest pain, palpitations or peripheral edema  GI: NEGATIVE for nausea, abdominal pain, heartburn, or change in bowel habits  : NEGATIVE for frequency, dysuria, or hematuria  MUSCULOSKELETAL: NEGATIVE for significant arthralgias or myalgia  NEURO: NEGATIVE for weakness, dizziness or paresthesias  HEME: NEGATIVE for bleeding problems  PSYCHIATRIC: NEGATIVE for changes in mood or affect    OBJECTIVE:                                                    /68  Pulse 78  Temp 97.6  F (36.4  C) (Oral)  Resp 14  Ht 5' 4\" (1.626 m)  Wt 104 lb 3.2 oz (47.3 kg)  SpO2 99%  BMI 17.89 kg/m2  Body mass index is 17.89 kg/(m^2).  GENERAL: healthy, alert and no distress  EYES: Eyes grossly normal to inspection, extraocular movements - intact, and PERRL  HENT: ear canals- normal; TMs- normal; Nose- normal; Mouth- no ulcers, no lesions  NECK: no tenderness, no adenopathy, no asymmetry, no masses, no stiffness; thyroid- normal to palpation  RESP: lungs clear to auscultation - no rales, no rhonchi, no wheezes  CV: regular rates and rhythm, normal S1 S2, no S3 or S4 and no murmur, no click or rub -  MS: extremities- no gross deformities noted, no edema  NEURO: strength and tone- normal, sensory exam- grossly normal, mentation- intact, speech- normal, reflexes- symmetric  PSYCH: Alert and oriented times 3; speech- coherent , normal rate and volume; able to articulate logical thoughts, able to " abstract reason, no tangential thoughts, no hallucinations or delusions, affect- normal     ASSESSMENT/PLAN:                                                    1. Osteoporosis, unspecified osteoporosis type, unspecified pathological fracture presence  I discussed a drug holiday for this patient but she is quite concerned due to her strong family history and classic symptoms thereof of osteoporosis as well as her recent bone density that she would like to continue with the medication.  - alendronate (FOSAMAX) 70 MG tablet; Take 1 tablet (70 mg) by mouth every 7 days  Dispense: 12 tablet; Refill: 3  We will obtain a baseline CMP..    2. Insomnia  Refilled accordingly continue with med as ordered  - amitriptyline (ELAVIL) 25 MG tablet; Take 1 tablet (25 mg) by mouth At Bedtime  Dispense: 90 tablet; Refill: 3    3. Need for prophylactic vaccination and inoculation against influenza  Flu vaccination has been given at the site of residence per  - FLU VACCINE, INCREASED ANTIGEN, PRESV FREE, AGE 65+ [75072]  - ADMIN INFLUENZA  (For MEDICARE Patients ONLY) []    4. Need for prophylactic vaccination with tetanus-diphtheria (Td)  Update tetanus booster recommended      See Patient Instructions    Bassem Howe MD  [facility]    THE MEDICATION LIST HAS BEEN FULLY RECONCILED BY THE M.D. AND THE NURSING STAFF.

## 2018-10-16 NOTE — LETTER
[facility]  [address] [id number] [phone]      10/16/2018       Courtney Howe  [address] [address]        Dear Courtney,    Your basic panel remains stable.    Sincerely,      Bassem Howe MD  Internal Medicine

## 2019-02-26 ENCOUNTER — TELEPHONE (OUTPATIENT)
Dept: INTERNAL MEDICINE | Facility: CLINIC | Age: 84
End: 2019-02-26

## 2019-02-26 NOTE — TELEPHONE ENCOUNTER
Patient should be seen to be tested for influenza to determine whether or not treatment is needed or whether or not patient needs prophylactic exposure dose.

## 2019-02-26 NOTE — TELEPHONE ENCOUNTER
Spoke with patient and she states that she has no transportation to get into the clinic since her  is in the hospital .  Did advise home treatment with rest, hydration and tylenol/ibuprofen for fevers.  Advised should be seen if seeking any medications for symptoms.      Patient wants to see if PCP would consider treatment since has no transportation?

## 2019-02-26 NOTE — TELEPHONE ENCOUNTER
If the patient does actually have influenza the treatment course is different than if she were to be prescribed prophylactic therapy for a family member with similar illness.  It is unclear to me which dosage the patient should be properly given

## 2019-02-26 NOTE — TELEPHONE ENCOUNTER
Dr. Howe,     Pt is calling with symptoms of the flu: coughing, runny nose, oral temp is 100, headache. Started yesterday.   has been sick starting Friday - went to hospital Sunday and was diagnosed with the flu.   Pt is wondering if she can get RX for Tamiflu?  Pharmacy pending. Please call pt when RX is sent in.

## 2019-09-09 DIAGNOSIS — M81.0 OSTEOPOROSIS, UNSPECIFIED OSTEOPOROSIS TYPE, UNSPECIFIED PATHOLOGICAL FRACTURE PRESENCE: ICD-10-CM

## 2019-09-09 NOTE — TELEPHONE ENCOUNTER
"Requested Prescriptions   Pending Prescriptions Disp Refills     alendronate (FOSAMAX) 70 MG tablet [Pharmacy Med Name: Alendronate Sodium Oral Tablet 70 MG] 12 tablet 2     Sig: Take 1 tablet (70 mg) by mouth every 7 days       Bisphosphonates Failed - 9/9/2019 10:01 AM        Failed - Dexa on file within past 2 years     Please review last Dexa result.           Failed - Normal serum creatinine on file within past 12 months     Recent Labs   Lab Test 10/16/18  0859   CR 0.50*             Passed - Recent (12 mo) or future (30 days) visit within the authorizing provider's specialty     Patient had office visit in the last 12 months or has a visit in the next 30 days with authorizing provider or within the authorizing provider's specialty.  See \"Patient Info\" tab in inbasket, or \"Choose Columns\" in Meds & Orders section of the refill encounter.              Passed - Medication is active on med list        Passed - Patient is age 18 or older        Last Written Prescription Date:  10/16/2018  Last Fill Quantity: 12,  # refills: 3   Last office visit: 10/16/2018 with prescribing provider:  Dr Howe   Future Office Visit:      "

## 2019-09-10 RX ORDER — ALENDRONATE SODIUM 70 MG/1
70 TABLET ORAL
Qty: 12 TABLET | Refills: 2 | Status: SHIPPED | OUTPATIENT
Start: 2019-09-10 | End: 2020-05-20

## 2019-10-03 ENCOUNTER — HEALTH MAINTENANCE LETTER (OUTPATIENT)
Age: 84
End: 2019-10-03

## 2020-02-08 ENCOUNTER — HEALTH MAINTENANCE LETTER (OUTPATIENT)
Age: 85
End: 2020-02-08

## 2020-03-02 ENCOUNTER — TRANSFERRED RECORDS (OUTPATIENT)
Dept: HEALTH INFORMATION MANAGEMENT | Facility: CLINIC | Age: 85
End: 2020-03-02

## 2020-05-19 DIAGNOSIS — M81.0 OSTEOPOROSIS, UNSPECIFIED OSTEOPOROSIS TYPE, UNSPECIFIED PATHOLOGICAL FRACTURE PRESENCE: ICD-10-CM

## 2020-05-19 DIAGNOSIS — G47.00 INSOMNIA, UNSPECIFIED TYPE: ICD-10-CM

## 2020-05-20 RX ORDER — ALENDRONATE SODIUM 70 MG/1
70 TABLET ORAL
Qty: 12 TABLET | Refills: 0 | Status: SHIPPED | OUTPATIENT
Start: 2020-05-20 | End: 2020-05-21

## 2020-05-20 NOTE — TELEPHONE ENCOUNTER
Last office visit: 10/16/2018 with prescribing provider:  Dr. Howe    Patient care team-    Appointment needed for patient.    Please call patient 2 times in attempt to schedule an appointment for ASAP.    If appointment scheduled, please check with patient to see if they have enough medication to get them to appointment.  Please route back to triage with the above information.    If unable to get a hold of patient, please route to the provider.

## 2020-05-21 ENCOUNTER — VIRTUAL VISIT (OUTPATIENT)
Dept: INTERNAL MEDICINE | Facility: CLINIC | Age: 85
End: 2020-05-21
Payer: COMMERCIAL

## 2020-05-21 VITALS — BODY MASS INDEX: 17.33 KG/M2 | HEIGHT: 65 IN | WEIGHT: 104 LBS

## 2020-05-21 DIAGNOSIS — Z00.00 MEDICARE ANNUAL WELLNESS VISIT, SUBSEQUENT: ICD-10-CM

## 2020-05-21 DIAGNOSIS — G47.00 INSOMNIA, UNSPECIFIED TYPE: ICD-10-CM

## 2020-05-21 DIAGNOSIS — M81.0 OSTEOPOROSIS, UNSPECIFIED OSTEOPOROSIS TYPE, UNSPECIFIED PATHOLOGICAL FRACTURE PRESENCE: Primary | ICD-10-CM

## 2020-05-21 DIAGNOSIS — Z78.0 ASYMPTOMATIC POSTMENOPAUSAL STATUS: ICD-10-CM

## 2020-05-21 PROCEDURE — 99213 OFFICE O/P EST LOW 20 MIN: CPT | Mod: 95 | Performed by: INTERNAL MEDICINE

## 2020-05-21 RX ORDER — ALENDRONATE SODIUM 70 MG/1
70 TABLET ORAL
Qty: 12 TABLET | Refills: 0 | Status: SHIPPED | OUTPATIENT
Start: 2020-05-21 | End: 2020-07-27

## 2020-05-21 ASSESSMENT — MIFFLIN-ST. JEOR: SCORE: 917.62

## 2020-05-21 NOTE — PROGRESS NOTES
"Courtney Howe is a 85 year old female who is being evaluated via a billable telephone visit.      The patient has been notified of following:     \"This telephone visit will be conducted via a call between you and your physician/provider. We have found that certain health care needs can be provided without the need for a physical exam.  This service lets us provide the care you need with a short phone conversation.  If a prescription is necessary we can send it directly to your pharmacy.  If lab work is needed we can place an order for that and you can then stop by our lab to have the test done at a later time.    Telephone visits are billed at different rates depending on your insurance coverage. During this emergency period, for some insurers they may be billed the same as an in-person visit.  Please reach out to your insurance provider with any questions.    If during the course of the call the physician/provider feels a telephone visit is not appropriate, you will not be charged for this service.\"    Patient has given verbal consent for Telephone visit?  Yes    What phone number would you like to be contacted at? [phone]    How would you like to obtain your AVS? Mail a copy    Subjective     Courtney Howe is a 85 year old female who presents via phone visit today for the following health issues:    HPI  Medication Followup of Fosamax    Taking Medication as prescribed: yes    Side Effects:  None    Medication Helping Symptoms:  yes          Patient Active Problem List   Diagnosis     Celiac disease     Pneumonitis     CARDIOVASCULAR SCREENING; LDL GOAL LESS THAN 160     Advance care planning     Palpitations     C. difficile diarrhea     Osteoporosis, unspecified osteoporosis type, unspecified pathological fracture presence     Insomnia, unspecified type     Past Surgical History:   Procedure Laterality Date     BIOPSY       BREAST SURGERY       COLONOSCOPY       GI SURGERY       HYSTERECTOMY, CERVIX " STATUS UNKNOWN       HYSTERECTOMY, PAP NO LONGER INDICATED         Social History     Tobacco Use     Smoking status: Never Smoker     Smokeless tobacco: Never Used   Substance Use Topics     Alcohol use: Yes     Comment: social     Family History   Problem Relation Age of Onset     Osteoporosis Mother      Cerebrovascular Disease Father          Current Outpatient Medications   Medication Sig Dispense Refill     alendronate (FOSAMAX) 70 MG tablet Take 1 tablet (70 mg) by mouth every 7 days 12 tablet 0     amitriptyline (ELAVIL) 25 MG tablet Take 1 tablet (25 mg) by mouth At Bedtime 90 tablet 0     ASPIRIN 81 MG OR TABS 1 TABLET DAILY       CALCIUM + D 600-200 MG-UNIT OR TABS 1 TABLET DAILY       MULTIVITAL OR TABS 1 TABLET DAILY       Allergies   Allergen Reactions     Wheat Gluten [Gluten Meal] Diarrhea     BP Readings from Last 3 Encounters:   10/16/18 112/68   06/22/17 108/68   06/17/17 118/70    Wt Readings from Last 3 Encounters:   05/21/20 47.2 kg (104 lb)   10/16/18 47.3 kg (104 lb 3.2 oz)   06/22/17 47 kg (103 lb 9.6 oz)                    Reviewed and updated as needed this visit by Provider         Review of Systems   CONSTITUTIONAL: NEGATIVE for fever, chills, change in weight  INTEGUMENTARY/SKIN: NEGATIVE for worrisome rashes, moles or lesions  ENT/MOUTH: NEGATIVE for ear, mouth and throat problems  RESP: NEGATIVE for significant cough or SOB  BREAST: NEGATIVE for masses, tenderness or discharge  CV: NEGATIVE for chest pain, palpitations or peripheral edema  GI: NEGATIVE for nausea, abdominal pain, heartburn, or change in bowel habits  : NEGATIVE for frequency, dysuria, or hematuria  MUSCULOSKELETAL: NEGATIVE for significant arthralgias or myalgia  NEURO: NEGATIVE for weakness, dizziness or paresthesias  ENDOCRINE: NEGATIVE for temperature intolerance, skin/hair changes  HEME: NEGATIVE for bleeding problems  PSYCHIATRIC: NEGATIVE for changes in mood or affect       Objective   Reported vitals:  There  were no vitals taken for this visit.   healthy, alert and no distress  PSYCH: Alert and oriented times 3; coherent speech, normal   rate and volume, able to articulate logical thoughts, able   to abstract reason, no tangential thoughts, no hallucinations   or delusions  Her affect is normal  RESP: No cough, no audible wheezing, able to talk in full sentences  Remainder of exam unable to be completed due to telephone visits        Assessment/Plan:    1. Osteoporosis, unspecified osteoporosis type, unspecified pathological fracture presence  Continuing with routine oral therapy as directed with recommendation of rechecking bone density scan in August of this year and at that time discussing potential benefit of Fosamax holiday.  - alendronate (FOSAMAX) 70 MG tablet; Take 1 tablet (70 mg) by mouth every 7 days  Dispense: 12 tablet; Refill: 0      2. Asymptomatic postmenopausal status  Ordered as screening  - DX Hip/Pelvis/Spine; Future    3. Insomnia, unspecified type  Refilled medication per patient request to be used as needed PRN sleep  - amitriptyline (ELAVIL) 25 MG tablet; Take 1 tablet (25 mg) by mouth At Bedtime  Dispense: 90 tablet; Refill: 0    4. Medicare annual wellness visit, subsequent  Discussed routine need for follow-up lab work in regards to updated wellness exam.  - Comprehensive metabolic panel; Future  - Hemoglobin; Future  - Lipid Profile; Future  - Also advised need to update shingles vaccination    Return in about 3 months (around 8/21/2020) for diagnostic test as ordered, Lab Work appointment.    Phone call duration:  14 minutes    Bassem Howe MD

## 2020-07-03 ENCOUNTER — ANCILLARY PROCEDURE (OUTPATIENT)
Dept: BONE DENSITY | Facility: CLINIC | Age: 85
End: 2020-07-03
Attending: INTERNAL MEDICINE
Payer: COMMERCIAL

## 2020-07-03 DIAGNOSIS — Z78.0 ASYMPTOMATIC POSTMENOPAUSAL STATUS: ICD-10-CM

## 2020-07-03 PROCEDURE — 77085 DXA BONE DENSITY AXL VRT FX: CPT | Performed by: INTERNAL MEDICINE

## 2020-07-13 ENCOUNTER — MYC MEDICAL ADVICE (OUTPATIENT)
Dept: INTERNAL MEDICINE | Facility: CLINIC | Age: 85
End: 2020-07-13

## 2020-07-15 ENCOUNTER — TELEPHONE (OUTPATIENT)
Dept: INTERNAL MEDICINE | Facility: CLINIC | Age: 85
End: 2020-07-15

## 2020-07-15 DIAGNOSIS — Z00.00 MEDICARE ANNUAL WELLNESS VISIT, SUBSEQUENT: ICD-10-CM

## 2020-07-15 LAB
ALBUMIN SERPL-MCNC: 3 G/DL (ref 3.4–5)
ALP SERPL-CCNC: 75 U/L (ref 40–150)
ALT SERPL W P-5'-P-CCNC: 25 U/L (ref 0–50)
ANION GAP SERPL CALCULATED.3IONS-SCNC: 5 MMOL/L (ref 3–14)
AST SERPL W P-5'-P-CCNC: 28 U/L (ref 0–45)
BILIRUB SERPL-MCNC: 0.4 MG/DL (ref 0.2–1.3)
BUN SERPL-MCNC: 7 MG/DL (ref 7–30)
CALCIUM SERPL-MCNC: 8 MG/DL (ref 8.5–10.1)
CHLORIDE SERPL-SCNC: 109 MMOL/L (ref 94–109)
CHOLEST SERPL-MCNC: 154 MG/DL
CO2 SERPL-SCNC: 25 MMOL/L (ref 20–32)
CREAT SERPL-MCNC: 0.48 MG/DL (ref 0.52–1.04)
GFR SERPL CREATININE-BSD FRML MDRD: 89 ML/MIN/{1.73_M2}
GLUCOSE SERPL-MCNC: 94 MG/DL (ref 70–99)
HDLC SERPL-MCNC: 46 MG/DL
HGB BLD-MCNC: 8.7 G/DL (ref 11.7–15.7)
LDLC SERPL CALC-MCNC: 85 MG/DL
NONHDLC SERPL-MCNC: 108 MG/DL
POTASSIUM SERPL-SCNC: 4 MMOL/L (ref 3.4–5.3)
PROT SERPL-MCNC: 7.2 G/DL (ref 6.8–8.8)
SODIUM SERPL-SCNC: 139 MMOL/L (ref 133–144)
TRIGL SERPL-MCNC: 114 MG/DL

## 2020-07-15 PROCEDURE — 80061 LIPID PANEL: CPT | Performed by: INTERNAL MEDICINE

## 2020-07-15 PROCEDURE — 80053 COMPREHEN METABOLIC PANEL: CPT | Performed by: INTERNAL MEDICINE

## 2020-07-15 PROCEDURE — 85018 HEMOGLOBIN: CPT | Performed by: INTERNAL MEDICINE

## 2020-07-15 PROCEDURE — 36415 COLL VENOUS BLD VENIPUNCTURE: CPT | Performed by: INTERNAL MEDICINE

## 2020-07-15 NOTE — TELEPHONE ENCOUNTER
Called patient to see if she would like appointment tomorrow instead of Friday regarding Hgb as a provider's schedule has now been opened for in clinic visit but she declined. Will keep appt for Friday as scheduled. Denies SOB.

## 2020-07-16 ENCOUNTER — VIRTUAL VISIT (OUTPATIENT)
Dept: INTERNAL MEDICINE | Facility: CLINIC | Age: 85
End: 2020-07-16
Payer: COMMERCIAL

## 2020-07-16 ENCOUNTER — TELEPHONE (OUTPATIENT)
Dept: INTERNAL MEDICINE | Facility: CLINIC | Age: 85
End: 2020-07-16

## 2020-07-16 DIAGNOSIS — D64.9 ANEMIA, UNSPECIFIED TYPE: Primary | ICD-10-CM

## 2020-07-16 PROCEDURE — 99214 OFFICE O/P EST MOD 30 MIN: CPT | Mod: 95 | Performed by: PHYSICIAN ASSISTANT

## 2020-07-16 NOTE — TELEPHONE ENCOUNTER
Reason for Call:  Other call back    Detailed comments:  Patient has in clinic visit scheduled for 07/17/2020 with Latia Magana   The patient is afraid to come into the clinic    Can this appointment be changed to a phone visit?   Please call patient to let her know     Phone Number Patient can be reached at: Home number on file [phone] (home)    Best Time: anytime    Can we leave a detailed message on this number? YES    Call taken on 7/16/2020 at 9:35 AM by THEA DE GUZMAN

## 2020-07-16 NOTE — TELEPHONE ENCOUNTER
Patient informed. She wants to do phone visit at this point. Declines video or face to face. Scheduled for today with Rizwana SIN. See result notes and telephone encounters.

## 2020-07-16 NOTE — PROGRESS NOTES
"Courtney Howe is a 85 year old female who is being evaluated via a billable telephone visit.      The patient has been notified of following:     \"This telephone visit will be conducted via a call between you and your physician/provider. We have found that certain health care needs can be provided without the need for a physical exam.  This service lets us provide the care you need with a short phone conversation.  If a prescription is necessary we can send it directly to your pharmacy.  If lab work is needed we can place an order for that and you can then stop by our lab to have the test done at a later time.    Telephone visits are billed at different rates depending on your insurance coverage. During this emergency period, for some insurers they may be billed the same as an in-person visit.  Please reach out to your insurance provider with any questions.    If during the course of the call the physician/provider feels a telephone visit is not appropriate, you will not be charged for this service.\"    Patient has given verbal consent for Telephone visit?  Yes    What phone number would you like to be contacted at? [phone]    How would you like to obtain your AVS? Abdulazizhart    Sarah     Courtney Howe is a 85 year old female who presents via phone visit today for the following health issues:    HPI    Patient is following up on recent low hemoglobin. Pt notes feeling tired, notes always tired, but may be worse now. Pt has a history of anemia and history of a needing blood transfusion.   - denies abdominal pain, dark stools, or blood in the stools   - denies acid reflux or swallowing changes   - denies blood in the urine   - denies Lightheadedness or dizziness   - denies heart racing, chest pain or shortness of breath   - reports history of recurrent anemia needing iron supplements in the past   - she has a history of celiacs, notes following gluten free diet and no stool changes     Reviewed and updated " as needed this visit by Provider  Meds  Problems                Objective   Reported vitals:  There were no vitals taken for this visit.   healthy, alert and no distress  PSYCH: Alert and oriented times 3; coherent speech, normal   rate and volume, able to articulate logical thoughts, able   to abstract reason, no tangential thoughts, no hallucinations   or delusions  Her affect is normal  RESP: No cough, no audible wheezing, able to talk in full sentences  Remainder of exam unable to be completed due to telephone visits    Diagnostic Test Results:  Labs reviewed in Epic        Assessment/Plan:    1. Anemia, unspecified type  - further work up as below  - reviewed when to seek emergency care  - plan for 1 week follow up with pcp   - will have her hold aspirin in the meantime   - CBC with platelets and differential; Future  - Iron and iron binding capacity; Future  - Ferritin; Future  - Blood Morphology Pathologist Review; Future  - CBC with platelets differential; Future  - Reticulocyte Count; Future  - Protein electrophoresis  - Fecal colorectal cancer screen (FIT); Future  - Vitamin B12; Future    Return in about 1 week (around 7/23/2020) for pcp review .      Phone call duration:  9 minutes    Rizwana Koo PA-C

## 2020-07-16 NOTE — TELEPHONE ENCOUNTER
It is quite challenging to evaluate anemia as such via a virtual visit.  We can certainly try but the patient needs to be informed that she will likely need to have to come into the clinic to get lab work done whether or not we do a virtual visit or not as there are numerous additional lab tests that will need to be performed to fully evaluate this and potential need for referrals depending on how patient wants to proceed

## 2020-07-23 DIAGNOSIS — D64.9 ANEMIA, UNSPECIFIED TYPE: ICD-10-CM

## 2020-07-23 LAB
BASOPHILS # BLD AUTO: 0 10E9/L (ref 0–0.2)
BASOPHILS NFR BLD AUTO: 0.4 %
DIFFERENTIAL METHOD BLD: ABNORMAL
EOSINOPHIL # BLD AUTO: 0.1 10E9/L (ref 0–0.7)
EOSINOPHIL NFR BLD AUTO: 1.6 %
ERYTHROCYTE [DISTWIDTH] IN BLOOD BY AUTOMATED COUNT: 19.7 % (ref 10–15)
FERRITIN SERPL-MCNC: 2 NG/ML (ref 8–252)
HCT VFR BLD AUTO: 28.3 % (ref 35–47)
HGB BLD-MCNC: 8 G/DL (ref 11.7–15.7)
IRON SATN MFR SERPL: 4 % (ref 15–46)
IRON SERPL-MCNC: 15 UG/DL (ref 35–180)
LYMPHOCYTES # BLD AUTO: 2.6 10E9/L (ref 0.8–5.3)
LYMPHOCYTES NFR BLD AUTO: 52.3 %
MCH RBC QN AUTO: 21.4 PG (ref 26.5–33)
MCHC RBC AUTO-ENTMCNC: 28.3 G/DL (ref 31.5–36.5)
MCV RBC AUTO: 76 FL (ref 78–100)
MONOCYTES # BLD AUTO: 0.4 10E9/L (ref 0–1.3)
MONOCYTES NFR BLD AUTO: 8.7 %
NEUTROPHILS # BLD AUTO: 1.9 10E9/L (ref 1.6–8.3)
NEUTROPHILS NFR BLD AUTO: 37 %
PLATELET # BLD AUTO: 471 10E9/L (ref 150–450)
RBC # BLD AUTO: 3.74 10E12/L (ref 3.8–5.2)
RETICS # AUTO: 58.5 10E9/L (ref 25–95)
RETICS/RBC NFR AUTO: 1.5 % (ref 0.5–2)
TIBC SERPL-MCNC: 349 UG/DL (ref 240–430)
VIT B12 SERPL-MCNC: 272 PG/ML (ref 193–986)
WBC # BLD AUTO: 5.1 10E9/L (ref 4–11)

## 2020-07-23 PROCEDURE — 85025 COMPLETE CBC W/AUTO DIFF WBC: CPT | Performed by: INTERNAL MEDICINE

## 2020-07-23 PROCEDURE — 83550 IRON BINDING TEST: CPT | Performed by: INTERNAL MEDICINE

## 2020-07-23 PROCEDURE — 00000402 ZZHCL STATISTIC TOTAL PROTEIN: Performed by: INTERNAL MEDICINE

## 2020-07-23 PROCEDURE — 36415 COLL VENOUS BLD VENIPUNCTURE: CPT | Performed by: INTERNAL MEDICINE

## 2020-07-23 PROCEDURE — 83540 ASSAY OF IRON: CPT | Performed by: INTERNAL MEDICINE

## 2020-07-23 PROCEDURE — 82728 ASSAY OF FERRITIN: CPT | Performed by: INTERNAL MEDICINE

## 2020-07-23 PROCEDURE — 82607 VITAMIN B-12: CPT | Performed by: INTERNAL MEDICINE

## 2020-07-23 PROCEDURE — 85045 AUTOMATED RETICULOCYTE COUNT: CPT | Performed by: INTERNAL MEDICINE

## 2020-07-23 PROCEDURE — 84165 PROTEIN E-PHORESIS SERUM: CPT | Performed by: INTERNAL MEDICINE

## 2020-07-23 PROCEDURE — 85060 BLOOD SMEAR INTERPRETATION: CPT | Performed by: INTERNAL MEDICINE

## 2020-07-24 LAB
ALBUMIN SERPL ELPH-MCNC: 3.3 G/DL (ref 3.7–5.1)
ALPHA1 GLOB SERPL ELPH-MCNC: 0.3 G/DL (ref 0.2–0.4)
ALPHA2 GLOB SERPL ELPH-MCNC: 0.8 G/DL (ref 0.5–0.9)
B-GLOBULIN SERPL ELPH-MCNC: 0.7 G/DL (ref 0.6–1)
COPATH REPORT: NORMAL
GAMMA GLOB SERPL ELPH-MCNC: 1.3 G/DL (ref 0.7–1.6)
M PROTEIN SERPL ELPH-MCNC: 0.2 G/DL
PROT PATTERN SERPL ELPH-IMP: ABNORMAL

## 2020-07-25 DIAGNOSIS — D64.9 ANEMIA, UNSPECIFIED TYPE: ICD-10-CM

## 2020-07-25 PROCEDURE — 82274 ASSAY TEST FOR BLOOD FECAL: CPT | Performed by: PHYSICIAN ASSISTANT

## 2020-07-27 ENCOUNTER — VIRTUAL VISIT (OUTPATIENT)
Dept: INTERNAL MEDICINE | Facility: CLINIC | Age: 85
End: 2020-07-27
Payer: COMMERCIAL

## 2020-07-27 DIAGNOSIS — D50.9 IRON DEFICIENCY ANEMIA, UNSPECIFIED IRON DEFICIENCY ANEMIA TYPE: Primary | ICD-10-CM

## 2020-07-27 DIAGNOSIS — G47.00 INSOMNIA, UNSPECIFIED TYPE: ICD-10-CM

## 2020-07-27 DIAGNOSIS — M81.0 OSTEOPOROSIS, UNSPECIFIED OSTEOPOROSIS TYPE, UNSPECIFIED PATHOLOGICAL FRACTURE PRESENCE: ICD-10-CM

## 2020-07-27 PROCEDURE — 99214 OFFICE O/P EST MOD 30 MIN: CPT | Mod: 95 | Performed by: INTERNAL MEDICINE

## 2020-07-27 RX ORDER — FERROUS SULFATE 325(65) MG
325 TABLET ORAL 2 TIMES DAILY
Qty: 60 TABLET | Refills: 1 | Status: SHIPPED | OUTPATIENT
Start: 2020-07-27 | End: 2021-11-09

## 2020-07-27 RX ORDER — ALENDRONATE SODIUM 70 MG/1
70 TABLET ORAL
Qty: 12 TABLET | Refills: 3 | Status: SHIPPED | OUTPATIENT
Start: 2020-07-27 | End: 2021-07-14

## 2020-07-27 NOTE — PROGRESS NOTES
"Courtney Howe is a 85 year old female who is being evaluated via a billable telephone visit.      The patient has been notified of following:     \"This telephone visit will be conducted via a call between you and your physician/provider. We have found that certain health care needs can be provided without the need for a physical exam.  This service lets us provide the care you need with a short phone conversation.  If a prescription is necessary we can send it directly to your pharmacy.  If lab work is needed we can place an order for that and you can then stop by our lab to have the test done at a later time.    Telephone visits are billed at different rates depending on your insurance coverage. During this emergency period, for some insurers they may be billed the same as an in-person visit.  Please reach out to your insurance provider with any questions.    If during the course of the call the physician/provider feels a telephone visit is not appropriate, you will not be charged for this service.\"    Patient has given verbal consent for Telephone visit?  Yes    What phone number would you like to be contacted at? [phone]     How would you like to obtain your AVS? Abdulazizhart    Subjective     Courtney Howe is a 85 year old female who presents via phone visit today for the following health issues:    HPI    Follow up on 7/23 labs for anemia     Component      Latest Ref Rng & Units 7/23/2020   WBC      4.0 - 11.0 10e9/L 5.1   RBC Count      3.8 - 5.2 10e12/L 3.74 (L)   Hemoglobin      11.7 - 15.7 g/dL 8.0 (L)   Hematocrit      35.0 - 47.0 % 28.3 (L)   MCV      78 - 100 fl 76 (L)   MCH      26.5 - 33.0 pg 21.4 (L)   MCHC      31.5 - 36.5 g/dL 28.3 (L)   RDW      10.0 - 15.0 % 19.7 (H)   Platelet Count      150 - 450 10e9/L 471 (H)   % Neutrophils      % 37.0   % Lymphocytes      % 52.3   % Monocytes      % 8.7   % Eosinophils      % 1.6   % Basophils      % 0.4   Absolute Neutrophil      1.6 - 8.3 10e9/L 1.9 "   Absolute Lymphocytes      0.8 - 5.3 10e9/L 2.6   Absolute Monocytes      0.0 - 1.3 10e9/L 0.4   Absolute Eosinophils      0.0 - 0.7 10e9/L 0.1   Absolute Basophils      0.0 - 0.2 10e9/L 0.0   Diff Method       Automated Method   Iron      35 - 180 ug/dL 15 (L)   Iron Binding Cap      240 - 430 ug/dL 349   Iron Saturation Index      15 - 46 % 4 (L)   % Retic      0.5 - 2.0 % 1.5   Absolute Retic      25 - 95 10e9/L 58.5   Vitamin B12      193 - 986 pg/mL 272   Ferritin      8 - 252 ng/mL 2 (L)       Patient Active Problem List   Diagnosis     Celiac disease     Pneumonitis     CARDIOVASCULAR SCREENING; LDL GOAL LESS THAN 160     Advance care planning     Palpitations     C. difficile diarrhea     Osteoporosis, unspecified osteoporosis type, unspecified pathological fracture presence     Insomnia, unspecified type     Past Surgical History:   Procedure Laterality Date     BIOPSY       BREAST SURGERY       COLONOSCOPY       GI SURGERY       HYSTERECTOMY, CERVIX STATUS UNKNOWN       HYSTERECTOMY, PAP NO LONGER INDICATED         Social History     Tobacco Use     Smoking status: Never Smoker     Smokeless tobacco: Never Used   Substance Use Topics     Alcohol use: Yes     Comment: social     Family History   Problem Relation Age of Onset     Osteoporosis Mother      Cerebrovascular Disease Father          Current Outpatient Medications   Medication Sig Dispense Refill     alendronate (FOSAMAX) 70 MG tablet Take 1 tablet (70 mg) by mouth every 7 days 12 tablet 0     amitriptyline (ELAVIL) 25 MG tablet Take 1 tablet (25 mg) by mouth At Bedtime 90 tablet 0     ASPIRIN 81 MG OR TABS 1 TABLET DAILY       CALCIUM + D 600-200 MG-UNIT OR TABS 1 TABLET DAILY       MULTIVITAL OR TABS 1 TABLET DAILY       Allergies   Allergen Reactions     Wheat Gluten [Gluten Meal] Diarrhea     BP Readings from Last 3 Encounters:   10/16/18 112/68   06/22/17 108/68   06/17/17 118/70    Wt Readings from Last 3 Encounters:   05/21/20 47.2 kg (104  lb)   10/16/18 47.3 kg (104 lb 3.2 oz)   06/22/17 47 kg (103 lb 9.6 oz)                    Reviewed and updated as needed this visit by Provider         Review of Systems   CONSTITUTIONAL: NEGATIVE for fever, chills, change in weight  EYES: NEGATIVE for vision changes or irritation  ENT/MOUTH: NEGATIVE for ear, mouth and throat problems  RESP: NEGATIVE for significant cough or SOB  CV: NEGATIVE for chest pain, palpitations or peripheral edema  GI: NEGATIVE for nausea, abdominal pain, heartburn, or change in bowel habits  : NEGATIVE for frequency, dysuria, or hematuria  MUSCULOSKELETAL: NEGATIVE for significant arthralgias or myalgia  NEURO: NEGATIVE for weakness, dizziness or paresthesias  HEME: NEGATIVE for bleeding problems  PSYCHIATRIC: NEGATIVE for changes in mood or affect       Objective   Reported vitals:  There were no vitals taken for this visit.   healthy, alert and no distress  PSYCH: Alert and oriented times 3; coherent speech, normal   rate and volume, able to articulate logical thoughts, able   to abstract reason, no tangential thoughts, no hallucinations   or delusions  Her affect is normal  RESP: No cough, no audible wheezing, able to talk in full sentences  Remainder of exam unable to be completed due to telephone visits          Assessment/Plan:    1. Insomnia, unspecified type  Refilled as requested  - amitriptyline (ELAVIL) 25 MG tablet; Take 1 tablet (25 mg) by mouth At Bedtime  Dispense: 90 tablet; Refill: 3    2. Osteoporosis, unspecified osteoporosis type, unspecified pathological fracture presence  Refilled per request  - alendronate (FOSAMAX) 70 MG tablet; Take 1 tablet (70 mg) by mouth every 7 days  Dispense: 12 tablet; Refill: 3    3. Iron deficiency anemia, unspecified iron deficiency anemia type  I discussed with patient the presence of her iron deficiency as well as the monoclonal protein noted on her protein electrophoresis.  I suggested she return in her fit kit so we can double  check to make sure she has no blood in her stool but that she may need a GI evaluation and recommendations to see a hematologist for further evaluation of the abnormal protein noted on her serum protein electrophoresis.  The patient is not interested in doing either of these referrals.  I discussed with her my concern and the risks associated with such.  She wants to try treating her anemia with some iron replacement therapy which I have sent to the pharmacy for her with a recheck planned in 4 to 6 weeks.  I discussed with her that the iron is treating the issue but not identifying the potential cause and she is still at this point not willing to see the specialist for further evaluation.  I did despite this place a referral to see a hematologist and told her that they will be calling her to schedule an appointment and that if she decides after thinking about it that she does not want to be seen but she can cancel the appointment.  - Oncology/Hematology Adult Referral; Future  - ferrous sulfate (FEROSUL) 325 (65 Fe) MG tablet; Take 1 tablet (325 mg) by mouth 2 times daily With meals  Dispense: 60 tablet; Refill: 1  - Hemoglobin; Future  - Iron and iron binding capacity; Future  - Ferritin; Future  - Transferrin; Future    Return in about 6 weeks (around 9/7/2020) for f/u with routine sub-specialist.      Phone call duration:  14 minutes    Bassem Howe MD

## 2020-07-29 ENCOUNTER — TELEPHONE (OUTPATIENT)
Dept: INTERNAL MEDICINE | Facility: CLINIC | Age: 85
End: 2020-07-29

## 2020-08-02 LAB — HEMOCCULT STL QL IA: NEGATIVE

## 2020-11-07 ENCOUNTER — HEALTH MAINTENANCE LETTER (OUTPATIENT)
Age: 85
End: 2020-11-07

## 2021-02-02 ENCOUNTER — IMMUNIZATION (OUTPATIENT)
Dept: NURSING | Facility: CLINIC | Age: 86
End: 2021-02-02
Payer: COMMERCIAL

## 2021-02-02 PROCEDURE — 0001A PR COVID VAC PFIZER DIL RECON 30 MCG/0.3 ML IM: CPT

## 2021-02-02 PROCEDURE — 91300 PR COVID VAC PFIZER DIL RECON 30 MCG/0.3 ML IM: CPT

## 2021-02-23 ENCOUNTER — IMMUNIZATION (OUTPATIENT)
Dept: NURSING | Facility: CLINIC | Age: 86
End: 2021-02-23
Attending: INTERNAL MEDICINE
Payer: COMMERCIAL

## 2021-02-23 PROCEDURE — 0002A PR COVID VAC PFIZER DIL RECON 30 MCG/0.3 ML IM: CPT

## 2021-02-23 PROCEDURE — 91300 PR COVID VAC PFIZER DIL RECON 30 MCG/0.3 ML IM: CPT

## 2021-03-21 ENCOUNTER — HEALTH MAINTENANCE LETTER (OUTPATIENT)
Age: 86
End: 2021-03-21

## 2021-05-24 ENCOUNTER — ANCILLARY PROCEDURE (OUTPATIENT)
Dept: GENERAL RADIOLOGY | Facility: CLINIC | Age: 86
End: 2021-05-24
Attending: PHYSICIAN ASSISTANT
Payer: COMMERCIAL

## 2021-05-24 ENCOUNTER — OFFICE VISIT (OUTPATIENT)
Dept: INTERNAL MEDICINE | Facility: CLINIC | Age: 86
End: 2021-05-24
Payer: COMMERCIAL

## 2021-05-24 VITALS
TEMPERATURE: 98.1 F | HEIGHT: 65 IN | WEIGHT: 99.9 LBS | SYSTOLIC BLOOD PRESSURE: 136 MMHG | HEART RATE: 82 BPM | OXYGEN SATURATION: 97 % | DIASTOLIC BLOOD PRESSURE: 80 MMHG | BODY MASS INDEX: 16.64 KG/M2 | RESPIRATION RATE: 15 BRPM

## 2021-05-24 DIAGNOSIS — S79.912A INJURY OF LEFT HIP, INITIAL ENCOUNTER: ICD-10-CM

## 2021-05-24 DIAGNOSIS — M25.552 HIP PAIN, LEFT: Primary | ICD-10-CM

## 2021-05-24 DIAGNOSIS — M25.552 HIP PAIN, LEFT: ICD-10-CM

## 2021-05-24 PROCEDURE — 99213 OFFICE O/P EST LOW 20 MIN: CPT | Performed by: PHYSICIAN ASSISTANT

## 2021-05-24 PROCEDURE — 73502 X-RAY EXAM HIP UNI 2-3 VIEWS: CPT | Mod: LT | Performed by: RADIOLOGY

## 2021-05-24 ASSESSMENT — MIFFLIN-ST. JEOR: SCORE: 894.02

## 2021-05-24 NOTE — PROGRESS NOTES
"    Assessment & Plan     Hip pain, left    - XR Hip Left 2-3 Views; Future    Injury of left hip, initial encounter    - XR Hip Left 2-3 Views; Future             Icing NSAID short term  Reassurance   Monitor      Return in about 3 months (around 8/24/2021) for annual wellness exam, regular primary provider, Routine Visit.    HENRIQUE Martinez [facility] SUAD Valdez is a 86 year old who presents for the following health issues     HPI     Musculoskeletal problem/pain  Onset/Duration: Began Saturday   Description  Location: hip - left  Joint Swelling: no  Redness: no- bruising   Pain: YES  Warmth: YES  Intensity:  mild  Progression of Symptoms:  worsening  Accompanying signs and symptoms:   Fevers: no  Numbness/tingling/weakness: no  History  Trauma to the area: YES- patient fell on left hip while trying to help    Recent illness:  no  Previous similar problem: no  Previous evaluation:  no  Precipitating or alleviating factors:  Aggravating factors include: sitting, standing and walking  Therapies tried and outcome: ice, tylenol- helps a little         Review of Systems   Constitutional, HEENT, cardiovascular, pulmonary, gi and gu systems are negative, except as otherwise noted.      Objective    /80   Pulse 82   Temp 98.1  F (36.7  C) (Temporal)   Resp 15   Ht 1.651 m (5' 5\")   Wt 45.3 kg (99 lb 14.4 oz)   SpO2 97%   BMI 16.62 kg/m    Body mass index is 16.62 kg/m .  Physical Exam   GENERAL: healthy, alert and no distress  RESP: lungs clear to auscultation - no rales, rhonchi or wheezes  CV: regular rate and rhythm, normal S1 S2, no S3 or S4, no murmur, click or rub, no peripheral edema and peripheral pulses strong  MS: bruising lateral left hip  Tenderness over the lateral trochanter  Good ROM of the hip no pain today   SKIN: no suspicious lesions or rashes  NEURO: Normal strength and tone, mentation intact and speech normal    Xray - " Reviewed and interpreted by me.  -no acute fx  overread pending by radiology

## 2021-09-05 ENCOUNTER — HEALTH MAINTENANCE LETTER (OUTPATIENT)
Age: 86
End: 2021-09-05

## 2021-10-01 DIAGNOSIS — M81.0 OSTEOPOROSIS, UNSPECIFIED OSTEOPOROSIS TYPE, UNSPECIFIED PATHOLOGICAL FRACTURE PRESENCE: ICD-10-CM

## 2021-10-01 DIAGNOSIS — G47.00 INSOMNIA, UNSPECIFIED TYPE: ICD-10-CM

## 2021-10-01 RX ORDER — ALENDRONATE SODIUM 70 MG/1
70 TABLET ORAL
Qty: 12 TABLET | Refills: 0 | Status: SHIPPED | OUTPATIENT
Start: 2021-10-01 | End: 2021-11-09

## 2021-10-01 NOTE — TELEPHONE ENCOUNTER
Routing refill request to provider for review/approval because:  Creatinine   Date Value Ref Range Status   07/15/2020 0.48 (L) 0.52 - 1.04 mg/dL Final             Merly Cao RN  [facility]

## 2021-10-01 NOTE — TELEPHONE ENCOUNTER
I have filled prescription patient is to be informed that prior to next refill she will need a clinic visit with me as it has been more than 1 year since seen virtually and has not seen me in the clinic since 2018

## 2021-10-01 NOTE — LETTER
[facility]  [address] [address]  [phone]            Courtney Howe  [address]  [place] [address]        October 4, 2021    Dear Courtney,    While refilling your prescription today, we noticed that you are due for an inperson visit with Dr. Howe.  We will refill your prescription for 90 days, but a follow-up appointment must be made before any additional refills can be approved.     Taking care of your health is important to us and we look forward to seeing you in the near future.  Please call us at [phone] or [phone] (or use [employer]) to schedule an appointment.     Please disregard this notice if you have already made an appointment.    Sincerely,        [facility]

## 2021-11-09 ENCOUNTER — OFFICE VISIT (OUTPATIENT)
Dept: INTERNAL MEDICINE | Facility: CLINIC | Age: 86
End: 2021-11-09
Payer: COMMERCIAL

## 2021-11-09 VITALS
OXYGEN SATURATION: 99 % | HEIGHT: 65 IN | BODY MASS INDEX: 16.41 KG/M2 | HEART RATE: 80 BPM | WEIGHT: 98.5 LBS | RESPIRATION RATE: 14 BRPM | TEMPERATURE: 97.1 F | SYSTOLIC BLOOD PRESSURE: 116 MMHG | DIASTOLIC BLOOD PRESSURE: 74 MMHG

## 2021-11-09 DIAGNOSIS — G47.00 INSOMNIA, UNSPECIFIED TYPE: ICD-10-CM

## 2021-11-09 DIAGNOSIS — Z13.6 CARDIOVASCULAR SCREENING; LDL GOAL LESS THAN 160: ICD-10-CM

## 2021-11-09 DIAGNOSIS — M81.0 OSTEOPOROSIS, UNSPECIFIED OSTEOPOROSIS TYPE, UNSPECIFIED PATHOLOGICAL FRACTURE PRESENCE: ICD-10-CM

## 2021-11-09 DIAGNOSIS — Z00.00 ENCOUNTER FOR SUBSEQUENT ANNUAL WELLNESS VISIT IN MEDICARE PATIENT: Primary | ICD-10-CM

## 2021-11-09 DIAGNOSIS — Z12.11 COLON CANCER SCREENING: ICD-10-CM

## 2021-11-09 DIAGNOSIS — D64.9 ANEMIA, UNSPECIFIED TYPE: ICD-10-CM

## 2021-11-09 PROBLEM — D50.9 IRON DEFICIENCY ANEMIA, UNSPECIFIED IRON DEFICIENCY ANEMIA TYPE: Status: ACTIVE | Noted: 2021-11-09

## 2021-11-09 PROCEDURE — 99397 PER PM REEVAL EST PAT 65+ YR: CPT | Performed by: INTERNAL MEDICINE

## 2021-11-09 PROCEDURE — 99214 OFFICE O/P EST MOD 30 MIN: CPT | Mod: 25 | Performed by: INTERNAL MEDICINE

## 2021-11-09 RX ORDER — TRAZODONE HYDROCHLORIDE 50 MG/1
50 TABLET, FILM COATED ORAL AT BEDTIME
Qty: 90 TABLET | Refills: 0 | Status: SHIPPED | OUTPATIENT
Start: 2021-11-09 | End: 2021-12-15

## 2021-11-09 RX ORDER — ALENDRONATE SODIUM 70 MG/1
70 TABLET ORAL
Qty: 12 TABLET | Refills: 1 | Status: SHIPPED | OUTPATIENT
Start: 2021-11-09 | End: 2022-06-15

## 2021-11-09 ASSESSMENT — ENCOUNTER SYMPTOMS
NERVOUS/ANXIOUS: 0
HEADACHES: 0
DYSURIA: 0
DIZZINESS: 0
ABDOMINAL PAIN: 0
SLEEP DISTURBANCE: 1
BREAST MASS: 0
DIARRHEA: 0
NAUSEA: 0
PALPITATIONS: 0
FREQUENCY: 0
HEMATOCHEZIA: 0
CONSTIPATION: 0
HEMATURIA: 0
RESPIRATORY NEGATIVE: 1
SHORTNESS OF BREATH: 0
HEARTBURN: 0
MYALGIAS: 0
PARESTHESIAS: 0
EYE PAIN: 0
SORE THROAT: 0
WEAKNESS: 0
FEVER: 0
COUGH: 1
ARTHRALGIAS: 0
CHILLS: 0
JOINT SWELLING: 0

## 2021-11-09 ASSESSMENT — ACTIVITIES OF DAILY LIVING (ADL): CURRENT_FUNCTION: NO ASSISTANCE NEEDED

## 2021-11-09 ASSESSMENT — MIFFLIN-ST. JEOR: SCORE: 882.67

## 2021-11-09 NOTE — PROGRESS NOTES
"SUBJECTIVE:   Courtney Howe is a 87 year old female who presents for Preventive Visit.  Patient has been advised of split billing requirements and indicates understanding: Yes   Are you in the first 12 months of your Medicare coverage?  No    Healthy Habits:    In general, how would you rate your overall health?  Good    Frequency of exercise:  None    Do you usually eat at least 4 servings of fruit and vegetables a day, include whole grains    & fiber and avoid regularly eating high fat or \"junk\" foods?  Yes    Taking medications regularly:  Yes    Medication side effects:  None    Ability to successfully perform activities of daily living:  No assistance needed    Home Safety:  No safety concerns identified    Hearing Impairment:  No hearing concerns    In the past 6 months, have you been bothered by leaking of urine?  No    In general, how would you rate your overall mental or emotional health?  Good      PHQ-2 Total Score:    Additional concerns today:  No  :    Do you feel safe in your environment? Yes    Have you ever done Advance Care Planning? (For example, a Health Directive, POLST, or a discussion with a medical provider or your loved ones about your wishes): No, advance care planning information given to patient to review.  Patient declined advance care planning discussion at this time.      Fall risk  Fallen 2 or more times in the past year?: No  Any fall with injury in the past year?: Yes  Timed Up and Go Test (>13.5 is fall risk; contact physician) : 4    Cognitive Screening   1) Repeat 3 items (Leader, Season, Table)    2) Clock draw: NORMAL  3) 3 item recall: Recalls 2 objects   Results: NORMAL clock, 1-2 items recalled: COGNITIVE IMPAIRMENT LESS LIKELY    Mini-CogTM Copyright KIEL Burger. Licensed by the author for use in [facility]; reprinted with permission (gustavo@.[facility]). All rights reserved.      Do you have sleep apnea, excessive snoring or daytime drowsiness?: no    Reviewed and " updated as needed this visit by clinical staff              Reviewed and updated as needed this visit by Provider               Social History     Tobacco Use     Smoking status: Never Smoker     Smokeless tobacco: Never Used   Substance Use Topics     Alcohol use: Yes     Comment: social     If you drink alcohol do you typically have >3 drinks per day or >7 drinks per week? No    Alcohol Use 11/25/2014   Prescreen: >3 drinks/day or >7 drinks/week? The patient does not drink >3 drinks per day nor >7 drinks per week.     Other concerns:  1. Problems with sleeping, patient would like to discuss switching from amitriptyline to something new.  She has tried Elavil in the past and does not feel it is working adequately.  She states that she only sleeps a couple hours.  In the past she has been offered the opportunity to evaluate her anemia per virtual visit.  She has not been interested.  I advised her that we would recheck her hemoglobin today.      Current providers sharing in care for this patient include:   Patient Care Team:  Bassem Howe MD as PCP - General  Bassem Howe MD as Assigned PCP  Sravanthi Mclean RN as     The following health maintenance items are reviewed in Epic and correct as of today:  Health Maintenance Due   Topic Date Due     ANNUAL REVIEW OF  ORDERS  Never done     ZOSTER IMMUNIZATION (2 of 3) 09/30/2009     MEDICARE ANNUAL WELLNESS VISIT  11/25/2015     FALL RISK ASSESSMENT  07/27/2021     COVID-19 Vaccine (3 - Booster for Pfizer series) 08/23/2021     INFLUENZA VACCINE (1) 09/01/2021       Immunization History   Administered Date(s) Administered     COVID-19,PF,Pfizer (12+ Yrs) 02/02/2021, 02/23/2021, 10/15/2021     FLUAD -HD 65+ QUAD ([facility] ONLY) 10/01/2020     Influenza (High Dose) 3 valent vaccine 10/01/2014, 10/01/2016, 10/01/2018, 10/08/2019     Influenza (IIV3) PF 11/30/2005, 09/24/2009     Pneumo Conj 13-V (2010&after) 10/12/2016     Pneumococcal 23 valent  "05/01/2002, 05/23/2007     TD (ADULT, 7+) 05/01/2002, 10/16/2018     Zoster vaccine, live 08/05/2009     Pertinent mammograms are reviewed under the imaging tab.    Review of Systems   Constitutional: Negative for chills and fever.   HENT: Negative.  Negative for congestion, hearing loss and sore throat.    Eyes: Negative for pain and visual disturbance.   Respiratory: Negative.  Negative for shortness of breath.    Cardiovascular: Negative for chest pain, palpitations and peripheral edema.   Gastrointestinal: Negative for abdominal pain, constipation, diarrhea, heartburn, hematochezia and nausea.   Breasts:  Negative for tenderness, breast mass and discharge.   Genitourinary: Negative for dysuria, frequency, genital sores, hematuria, pelvic pain, urgency, vaginal bleeding and vaginal discharge.   Musculoskeletal: Negative for arthralgias, joint swelling and myalgias.   Skin: Negative for rash.   Neurological: Negative for dizziness, weakness, headaches and paresthesias.   Psychiatric/Behavioral: Positive for sleep disturbance. Negative for mood changes. The patient is not nervous/anxious.        OBJECTIVE:   /74   Pulse 80   Temp 97.1  F (36.2  C) (Oral)   Resp 14   Ht 1.651 m (5' 5\")   Wt 44.7 kg (98 lb 8 oz)   SpO2 99%   BMI 16.39 kg/m   Estimated body mass index is 16.39 kg/m  as calculated from the following:    Height as of this encounter: 1.651 m (5' 5\").    Weight as of this encounter: 44.7 kg (98 lb 8 oz).     Physical Exam  GENERAL: alert and no distress  EYES: Eyes grossly normal to inspection, PERRL and conjunctivae and sclerae normal  HENT: ear canals and TM's normal, nose and mouth without ulcers or lesions  NECK: no adenopathy, no asymmetry, masses, or scars and thyroid normal to palpation  RESP: lungs clear to auscultation - no rales, rhonchi or wheezes  CV: regular rate and rhythm, normal S1 S2, no S3 or S4, no click or rub  MS: no gross musculoskeletal defects noted  NEURO: No focal " "changes  PSYCH: mentation appears normal, affect flat    ASSESSMENT / PLAN:   (Z00.00) Encounter for subsequent annual wellness visit in Medicare patient  (primary encounter diagnosis)  Comment: Suggested patient consider shingles vaccination.  Plan: CBC with platelets, Comprehensive metabolic         panel, Lipid Profile        Advised patient to follow-up for lab appointment fasting    (Z13.6) CARDIOVASCULAR SCREENING; LDL GOAL LESS THAN 160  Comment: Suggest fasting lipid panel accordingly.  Plan: Lipid Profile            (D64.9) Anemia, unspecified type  Comment: Discussed patient's labs in the past.  We will recheck hemoglobin today.  Plan: CBC with platelets            (M81.0) Osteoporosis, unspecified osteoporosis type, unspecified pathological fracture presence  Comment: Refilled Fosamax accordingly per last bone density scan  Plan: alendronate (FOSAMAX) 70 MG tablet            (Z12.11) Colon cancer screening  Comment: Recommend annual FIT testing  Plan: Fecal colorectal cancer screen FIT            (G47.00) Insomnia, unspecified type  Comment: Suggest trial of trazodone 50 mg at bedtime.  Discussed benefits with patient.  Suggest holding Elavil  Plan: traZODone (DESYREL) 50 MG tablet, OFFICE/OUTPT         VISIT,ESTLEVL IV              Patient has been advised of split billing requirements and indicates understanding: Yes  COUNSELING:  Reviewed preventive health counseling, as reflected in patient instructions       Regular exercise       Healthy diet/nutrition    Estimated body mass index is 16.62 kg/m  as calculated from the following:    Height as of 5/24/21: 1.651 m (5' 5\").    Weight as of 5/24/21: 45.3 kg (99 lb 14.4 oz).      She reports that she has never smoked. She has never used smokeless tobacco.    Appropriate preventive services were discussed with this patient, including applicable screening as appropriate for cardiovascular disease, diabetes, osteopenia/osteoporosis, and glaucoma.  As " appropriate for age/gender, discussed screening for colorectal cancer, prostate cancer, breast cancer, and cervical cancer. Checklist reviewing preventive services available has been given to the patient.    Reviewed patients plan of care and provided an AVS. The Basic Care Plan (routine screening as documented in Health Maintenance) for Courtney meets the Care Plan requirement. This Care Plan has been established and reviewed with the Patient.    Counseling Resources:  ATP IV Guidelines  Pooled Cohorts Equation Calculator  Breast Cancer Risk Calculator  Breast Cancer: Medication to Reduce Risk  FRAX Risk Assessment  ICSI Preventive Guidelines  Dietary Guidelines for Americans, 2010  USDA's MyPlate  ASA Prophylaxis  Lung CA Screening    Bassem Howe MD  [facility]    Identified Health Risks:

## 2021-11-24 ENCOUNTER — LAB (OUTPATIENT)
Dept: LAB | Facility: CLINIC | Age: 86
End: 2021-11-24
Payer: COMMERCIAL

## 2021-11-24 DIAGNOSIS — Z00.00 ENCOUNTER FOR SUBSEQUENT ANNUAL WELLNESS VISIT IN MEDICARE PATIENT: ICD-10-CM

## 2021-11-24 DIAGNOSIS — D64.9 ANEMIA, UNSPECIFIED TYPE: ICD-10-CM

## 2021-11-24 DIAGNOSIS — Z13.6 CARDIOVASCULAR SCREENING; LDL GOAL LESS THAN 160: ICD-10-CM

## 2021-11-24 DIAGNOSIS — Z12.11 COLON CANCER SCREENING: ICD-10-CM

## 2021-11-24 LAB
ALBUMIN SERPL-MCNC: 2.7 G/DL (ref 3.4–5)
ALP SERPL-CCNC: 84 U/L (ref 40–150)
ALT SERPL W P-5'-P-CCNC: 26 U/L (ref 0–50)
ANION GAP SERPL CALCULATED.3IONS-SCNC: 4 MMOL/L (ref 3–14)
AST SERPL W P-5'-P-CCNC: 27 U/L (ref 0–45)
BILIRUB SERPL-MCNC: 0.3 MG/DL (ref 0.2–1.3)
BUN SERPL-MCNC: 8 MG/DL (ref 7–30)
CALCIUM SERPL-MCNC: 8.5 MG/DL (ref 8.5–10.1)
CHLORIDE BLD-SCNC: 112 MMOL/L (ref 94–109)
CHOLEST SERPL-MCNC: 152 MG/DL
CO2 SERPL-SCNC: 26 MMOL/L (ref 20–32)
CREAT SERPL-MCNC: 0.48 MG/DL (ref 0.52–1.04)
ERYTHROCYTE [DISTWIDTH] IN BLOOD BY AUTOMATED COUNT: 19.2 % (ref 10–15)
FASTING STATUS PATIENT QL REPORTED: YES
GFR SERPL CREATININE-BSD FRML MDRD: 88 ML/MIN/1.73M2
GLUCOSE BLD-MCNC: 90 MG/DL (ref 70–99)
HCT VFR BLD AUTO: 30 % (ref 35–47)
HDLC SERPL-MCNC: 39 MG/DL
HGB BLD-MCNC: 8.5 G/DL (ref 11.7–15.7)
LDLC SERPL CALC-MCNC: 86 MG/DL
MCH RBC QN AUTO: 21.7 PG (ref 26.5–33)
MCHC RBC AUTO-ENTMCNC: 28.3 G/DL (ref 31.5–36.5)
MCV RBC AUTO: 77 FL (ref 78–100)
NONHDLC SERPL-MCNC: 113 MG/DL
PLATELET # BLD AUTO: 437 10E3/UL (ref 150–450)
POTASSIUM BLD-SCNC: 4.2 MMOL/L (ref 3.4–5.3)
PROT SERPL-MCNC: 6.9 G/DL (ref 6.8–8.8)
RBC # BLD AUTO: 3.92 10E6/UL (ref 3.8–5.2)
SODIUM SERPL-SCNC: 142 MMOL/L (ref 133–144)
TRIGL SERPL-MCNC: 134 MG/DL
WBC # BLD AUTO: 4.3 10E3/UL (ref 4–11)

## 2021-11-24 PROCEDURE — 85027 COMPLETE CBC AUTOMATED: CPT

## 2021-11-24 PROCEDURE — 80061 LIPID PANEL: CPT

## 2021-11-24 PROCEDURE — 36415 COLL VENOUS BLD VENIPUNCTURE: CPT

## 2021-11-24 PROCEDURE — 80053 COMPREHEN METABOLIC PANEL: CPT

## 2021-11-25 PROCEDURE — 82274 ASSAY TEST FOR BLOOD FECAL: CPT

## 2021-11-30 LAB — HEMOCCULT STL QL IA: NEGATIVE

## 2021-12-15 ENCOUNTER — VIRTUAL VISIT (OUTPATIENT)
Dept: INTERNAL MEDICINE | Facility: CLINIC | Age: 86
End: 2021-12-15
Payer: COMMERCIAL

## 2021-12-15 DIAGNOSIS — G47.00 INSOMNIA, UNSPECIFIED TYPE: ICD-10-CM

## 2021-12-15 DIAGNOSIS — D50.9 IRON DEFICIENCY ANEMIA, UNSPECIFIED IRON DEFICIENCY ANEMIA TYPE: Primary | ICD-10-CM

## 2021-12-15 PROCEDURE — 99213 OFFICE O/P EST LOW 20 MIN: CPT | Mod: 95 | Performed by: INTERNAL MEDICINE

## 2021-12-15 RX ORDER — MIRTAZAPINE 7.5 MG/1
7.5 TABLET, FILM COATED ORAL AT BEDTIME
Qty: 30 TABLET | Refills: 0 | Status: SHIPPED | OUTPATIENT
Start: 2021-12-15 | End: 2022-01-19

## 2021-12-15 RX ORDER — ZOLPIDEM TARTRATE 5 MG/1
2.5 TABLET ORAL
Qty: 15 TABLET | Refills: 0 | Status: SHIPPED | OUTPATIENT
Start: 2021-12-15 | End: 2021-12-15 | Stop reason: ALTCHOICE

## 2021-12-15 RX ORDER — FERROUS SULFATE 325(65) MG
325 TABLET ORAL 2 TIMES DAILY
Qty: 60 TABLET | Refills: 0 | Status: SHIPPED | OUTPATIENT
Start: 2021-12-15 | End: 2022-02-23

## 2021-12-15 NOTE — PROGRESS NOTES
Courtney is a 87 year old who is being evaluated via a billable telephone visit.      What phone number would you like to be contacted at? [phone]  How would you like to obtain your AVS? Ligia    Assessment & Plan     Iron deficiency anemia, unspecified iron deficiency anemia type  I discussed again with patient the presence of her iron deficiency as well as the monoclonal protein noted on her protein electrophoresis.  I suggested she may need a GI evaluation and recommendations to see a hematologist for further evaluation of the abnormal protein noted on her serum protein electrophoresis.  I did even place a referral to see the hematologist per last virtual visit but the patient apparently did not go to the appointment the patient is not interested in doing either of these referrals.  I discussed with her my concern and the risks associated with such.   I discussed with her that the iron is treating the issue but not identifying the potential cause and she is still at this point not willing to see the specialist for further evaluation  - ferrous sulfate (FEROSUL) 325 (65 Fe) MG tablet; Take 1 tablet (325 mg) by mouth 2 times daily With meals  - CBC with platelets; Future  - Iron & Iron Binding Capacity; Future  - Ferritin; Future    Insomnia, unspecified type  Patient also states that she has had some significant issues with sleeping.  This is started to really affect the quality of her life and the fact that she can only sleep a couple hours at night.  We tried some trazodone and it did not work.  She has tried over-the-counter products in the form of Benadryl, melatonin etc. with no improvement.  I had a almas discussion with her about my concern about using prescription based medications particularly in her age group but she really wants to try because she is so desperate to try to get some sleep.  After discussion I have opted to give her low-dose trial of Remeron 7.5 mg in the evening at bedtime.  This  may be helpful for her as I oftentimes wonder whether not she may have some mild low-grade depression we have seen a dip in her weight.  I discussed with her the options available.  She will stop her trazodone.  She will follow-up with me in 30 days to let me know how she is doing.         See Patient Instructions    No follow-ups on file.    Bassem Howe MD  [facility]    .     Sarah Valdez is a 87 year old who presents for the following health issues     HPI     Follow up on 11/24/21 hgb     Medication Followup of traZODone (DESYREL) 50 MG tablet    Taking Medication as prescribed: NO- patient decreased down to a 1/4 tablet due to side effects     Side Effects:  Itching all over body, mood swings     Medication Helping Symptoms:  NO-patient states no improvement with sleep while on medication        Review of Systems   CONSTITUTIONAL: NEGATIVE for fever, chills, change in weight  EYES: NEGATIVE for vision changes or irritation  ENT/MOUTH: NEGATIVE for ear, mouth and throat problems  RESP: NEGATIVE for significant cough or SOB  CV: NEGATIVE for chest pain, palpitations or peripheral edema  GI: NEGATIVE for nausea, abdominal pain, heartburn, or change in bowel habits  : NEGATIVE for frequency, dysuria, or hematuria  MUSCULOSKELETAL: NEGATIVE for significant arthralgias or myalgia  NEURO: NEGATIVE for weakness, dizziness or paresthesias  HEME: NEGATIVE for bleeding problems  PSYCHIATRIC: NEGATIVE for changes in mood or affect      Objective       Vitals:  No vitals were obtained today due to virtual visit.    Physical Exam   healthy, alert and no distress  PSYCH: Alert and oriented times 3; coherent speech, normal   rate and volume, able to articulate logical thoughts, able   to abstract reason, no tangential thoughts, no hallucinations   or delusions  Her affect is normal  RESP: No cough, no audible wheezing, able to talk in full sentences  Remainder of exam unable to be  completed due to telephone visits    Component      Latest Ref Rng & Units 7/15/2020 7/23/2020 11/24/2021   WBC      4.0 - 11.0 10e3/uL  5.1 4.3   RBC Count      3.80 - 5.20 10e6/uL  3.74 (L) 3.92   Hemoglobin      11.7 - 15.7 g/dL 8.7 (L) 8.0 (L) 8.5 (L)   Hematocrit      35.0 - 47.0 %  28.3 (L) 30.0 (L)   MCV      78 - 100 fL  76 (L) 77 (L)   MCH      26.5 - 33.0 pg  21.4 (L) 21.7 (L)   MCHC      31.5 - 36.5 g/dL  28.3 (L) 28.3 (L)   RDW      10.0 - 15.0 %  19.7 (H) 19.2 (H)   Platelet Count      150 - 450 10e3/uL  471 (H) 437   % Neutrophils      %  37.0    % Lymphocytes      %  52.3    % Monocytes      %  8.7    % Eosinophils      %  1.6    % Basophils      %  0.4    Absolute Neutrophil      1.6 - 8.3 10e9/L  1.9    Absolute Lymphocytes      0.8 - 5.3 10e9/L  2.6    Absolute Monocytes      0.0 - 1.3 10e9/L  0.4    Absolute Eosinophils      0.0 - 0.7 10e9/L  0.1    Absolute Basophils      0.0 - 0.2 10e9/L  0.0    Diff Method        Automated Method    Sodium      133 - 144 mmol/L   142   Potassium      3.4 - 5.3 mmol/L   4.2   Chloride      94 - 109 mmol/L   112 (H)   Carbon Dioxide      20 - 32 mmol/L   26   Anion Gap      3 - 14 mmol/L   4   Urea Nitrogen      7 - 30 mg/dL   8   Creatinine      0.52 - 1.04 mg/dL   0.48 (L)   Calcium      8.5 - 10.1 mg/dL   8.5   Glucose      70 - 99 mg/dL   90   Alkaline Phosphatase      40 - 150 U/L   84   AST      0 - 45 U/L   27   ALT      0 - 50 U/L   26   Protein Total      6.8 - 8.8 g/dL   6.9   Albumin      3.4 - 5.0 g/dL   2.7 (L)   Bilirubin Total      0.2 - 1.3 mg/dL   0.3   GFR Estimate      >60 mL/min/1.73m2   88   Cholesterol      <200 mg/dL   152   Triglycerides      <150 mg/dL   134   HDL Cholesterol      >=50 mg/dL   39 (L)   LDL Cholesterol Calculated      <=100 mg/dL   86   Non HDL Cholesterol      <130 mg/dL   113   Patient Fasting > 8hrs?         Yes   Occult Blood Scn FIT      Negative                  Phone call duration: 15 minutes

## 2022-01-18 ENCOUNTER — LAB (OUTPATIENT)
Dept: LAB | Facility: CLINIC | Age: 87
End: 2022-01-18
Payer: COMMERCIAL

## 2022-01-18 DIAGNOSIS — D50.9 IRON DEFICIENCY ANEMIA, UNSPECIFIED IRON DEFICIENCY ANEMIA TYPE: ICD-10-CM

## 2022-01-18 LAB
ERYTHROCYTE [DISTWIDTH] IN BLOOD BY AUTOMATED COUNT: 20.7 % (ref 10–15)
FERRITIN SERPL-MCNC: 3 NG/ML (ref 8–252)
HCT VFR BLD AUTO: 30.9 % (ref 35–47)
HGB BLD-MCNC: 8.5 G/DL (ref 11.7–15.7)
IRON SATN MFR SERPL: 3 % (ref 15–46)
IRON SERPL-MCNC: 12 UG/DL (ref 35–180)
MCH RBC QN AUTO: 21.4 PG (ref 26.5–33)
MCHC RBC AUTO-ENTMCNC: 27.5 G/DL (ref 31.5–36.5)
MCV RBC AUTO: 78 FL (ref 78–100)
PLATELET # BLD AUTO: 503 10E3/UL (ref 150–450)
RBC # BLD AUTO: 3.98 10E6/UL (ref 3.8–5.2)
TIBC SERPL-MCNC: 371 UG/DL (ref 240–430)
WBC # BLD AUTO: 7.2 10E3/UL (ref 4–11)

## 2022-01-18 PROCEDURE — 83550 IRON BINDING TEST: CPT

## 2022-01-18 PROCEDURE — 82728 ASSAY OF FERRITIN: CPT

## 2022-01-18 PROCEDURE — 36415 COLL VENOUS BLD VENIPUNCTURE: CPT

## 2022-01-18 PROCEDURE — 85027 COMPLETE CBC AUTOMATED: CPT

## 2022-01-19 DIAGNOSIS — G47.00 INSOMNIA, UNSPECIFIED TYPE: ICD-10-CM

## 2022-01-19 RX ORDER — MIRTAZAPINE 7.5 MG/1
7.5 TABLET, FILM COATED ORAL AT BEDTIME
Qty: 30 TABLET | Refills: 2 | Status: SHIPPED | OUTPATIENT
Start: 2022-01-19 | End: 2022-05-11 | Stop reason: ALTCHOICE

## 2022-01-19 NOTE — TELEPHONE ENCOUNTER
Patient calling regarding recent lab results.     Patient is not interested in pursuing additional testings because patient is 'feeling pretty good'.

## 2022-02-02 ENCOUNTER — MYC MEDICAL ADVICE (OUTPATIENT)
Dept: INTERNAL MEDICINE | Facility: CLINIC | Age: 87
End: 2022-02-02
Payer: COMMERCIAL

## 2022-02-02 NOTE — TELEPHONE ENCOUNTER
Called patient to get more information. Pt had one episode of bloody vaginal discharge yesterday on 2/1/2022. No episodes since. No bloody stool or blood on tissue from wiping after bowel movement. No abdominal pain or cramping. Pt described the discharge as bright red with no clots. Has never had this happen in the past. No dizziness or lightheadedness. Set up patient with [facility] on 2/16/2022 (as this was the first V.V.). Advised patient to call back if symptoms reoccur or if pain.     PCP: to review and advise if patient should be seen sooner or if you would like patient to be referred somewhere else?    Sandy Montgomery RN

## 2022-02-02 NOTE — TELEPHONE ENCOUNTER
Patient needs to be seen in the gynecology clinic.  Full evaluation of vaginal bleeding should be done by her gynecologist.

## 2022-02-02 NOTE — TELEPHONE ENCOUNTER
Called patient and relayed message. Phone number given for obstetrics and gynecology at [facility] as previous gynecologist was through [facility], which is no longer covered under her insurance. Appointment with Dr. Howe cancelled per pt request.     Sandy Montgomery RN

## 2022-02-11 ENCOUNTER — MYC MEDICAL ADVICE (OUTPATIENT)
Dept: INTERNAL MEDICINE | Facility: CLINIC | Age: 87
End: 2022-02-11
Payer: COMMERCIAL

## 2022-02-23 ENCOUNTER — OFFICE VISIT (OUTPATIENT)
Dept: DERMATOLOGY | Facility: CLINIC | Age: 87
End: 2022-02-23
Payer: COMMERCIAL

## 2022-02-23 VITALS — OXYGEN SATURATION: 100 % | DIASTOLIC BLOOD PRESSURE: 67 MMHG | HEART RATE: 81 BPM | SYSTOLIC BLOOD PRESSURE: 133 MMHG

## 2022-02-23 DIAGNOSIS — D48.5 NEOPLASM OF UNCERTAIN BEHAVIOR OF SKIN: Primary | ICD-10-CM

## 2022-02-23 PROCEDURE — 99213 OFFICE O/P EST LOW 20 MIN: CPT | Mod: 25 | Performed by: PHYSICIAN ASSISTANT

## 2022-02-23 PROCEDURE — 88305 TISSUE EXAM BY PATHOLOGIST: CPT | Performed by: DERMATOLOGY

## 2022-02-23 PROCEDURE — 11102 TANGNTL BX SKIN SINGLE LES: CPT | Performed by: PHYSICIAN ASSISTANT

## 2022-02-23 NOTE — PATIENT INSTRUCTIONS
Wound Care Instructions     FOR SUPERFICIAL WOUNDS     [facility] or     [facility] [phone]          AFTER 24 HOURS YOU SHOULD REMOVE THE BANDAGE AND BEGIN DAILY DRESSING CHANGES AS FOLLOWS:     1) Remove Dressing.     2) Clean and dry the area with tap water using a Q-tip or sterile gauze pad.     3) Apply Vaseline, Aquaphor, Polysporin ointment or Bacitracin ointment over entire wound.  Do NOT use Neosporin ointment.     4) Cover the wound with a band-aid, or a sterile non-stick gauze pad and micropore paper tape      REPEAT THESE INSTRUCTIONS AT LEAST ONCE A DAY UNTIL THE WOUND HAS COMPLETELY HEALED.    It is an old wives tale that a wound heals better when it is exposed to air and allowed to dry out. The wound will heal faster with a better cosmetic result if it is kept moist with ointment and covered with a bandage.    **Do not let the wound dry out.**      Supplies Needed:      *Cotton tipped applicators (Q-tips)    *Polysporin Ointment or Bacitracin Ointment (NOT NEOSPORIN)    *Band-aids or non-stick gauze pads and micropore paper tape.      PATIENT INFORMATION:    During the healing process you will notice a number of changes. All wounds develop a small halo of redness surrounding the wound.  This means healing is occurring. Severe itching with extensive redness usually indicates sensitivity to the ointment or bandage tape used to dress the wound.  You should call our office if this develops.      Swelling  and/or discoloration around your surgical site is common, particularly when performed around the eye.    All wounds normally drain.  The larger the wound the more drainage there will be.  After 7-10 days, you will notice the wound beginning to shrink and new skin will begin to grow.  The wound is healed when you can see skin has formed over the entire area.  A healed wound has a healthy, shiny look to the surface and is red to dark pink in color to normalize.   Wounds may take approximately 4-6 weeks to heal.  Larger wounds may take 6-8 weeks.  After the wound is healed you may discontinue dressing changes.    You may experience a sensation of tightness as your wound heals. This is normal and will gradually subside.    Your healed wound may be sensitive to temperature changes. This sensitivity improves with time, but if you re having a lot of discomfort, try to avoid temperature extremes.    Patients frequently experience itching after their wound appears to have healed because of the continue healing under the skin.  Plain Vaseline will help relieve the itching.        POSSIBLE COMPLICATIONS    BLEEDIN. Leave the bandage in place.  2. Use tightly rolled up gauze or a cloth to apply direct pressure over the bandage for 30  minutes.  3. Reapply pressure for an additional 30 minutes if necessary  4. Use additional gauze and tape to maintain pressure once the bleeding has stopped.        Proper skin care from [facility]:    -Eliminate harsh soaps as they strip the natural oils from the skin, often resulting in dry itchy skin ( i.e. Dial, Zest, [place])  -Use mild soaps such as Cetaphil or Dove Sensitive Skin in the shower. You do not need to use soap on arms, legs, and trunk every time you shower unless visibly soiled.   -Avoid hot or cold showers.  -After showering, lightly dry off and apply moisturizing within 2-3 minutes. This will help trap moisture in the skin.   -Aggressive use of a moisturizer at least 1-2 times a day to the entire body (including -Vanicream, Cetaphil, Aquaphor or Cerave) and moisturize hands after every washing.  -We recommend using moisturizers that come in a tub that needs to be scooped out, not a pump. This has more of an oil base. It will hold moisture in your skin much better than a water base moisturizer. The above recommended are non-pore clogging.      Wear a sunscreen with at least SPF 30 on your face, ears, neck and V  of the chest daily. Wear sunscreen on other areas of the body if those areas are exposed to the sun throughout the day. Sunscreens can contain physical and/or chemical blockers. Physical blockers are less likely to clog pores, these include zinc oxide and titanium dioxide. Reapply every two hour and after swimming.     Sunscreen examples: https://www.ewg.org/sunscreen/    UV radiation  UVA radiation remains constant throughout the day and throughout the year. It is a longer wavelength than UVB and therefore penetrates deeper into the skin leading to immediate and delayed tanning, photoaging, and skin cancer. 70-80% of UVA and UVB radiation occurs between the hours of 10am-2pm.  UVB radiation  UVB radiation causes the most harmful effects and is more significant during the summer months. However, snow and ice can reflect UVB radiation leading to skin damage during the winter months as well. UVB radiation is responsible for tanning, burning, inflammation, delayed erythema (pinkness), pigmentation (brown spots), and skin cancer.     I recommend self monthly full body exams and yearly full body exams with a dermatology provider. If you develop a new or changing lesion please follow up for examination. Most skin cancers are pink and scaly or pink and pearly. However, we do see blue/brown/black skin cancers.  Consider the ABCDEs of melanoma when giving yourself your monthly full body exam ( don't forget the groin, buttocks, feet, toes, etc). A-asymmetry, B-borders, C-color, D-diameter, E-elevation or evolving. If you see any of these changes please follow up in clinic. If you cannot see your back I recommend purchasing a hand held mirror to use with a larger wall mirror.

## 2022-02-23 NOTE — LETTER
2/23/2022         RE: Courtney Howe  [address]  [place] [address]        Dear Colleague,    Thank you for referring your patient, Courtney Howe, to the [facility]. Please see a copy of my visit note below.    HPI:  Courtney Howe is a 87 year old female patient here today for spot on right eyebreow .  Patient states this has been present for 10 years.  Patient reports the following symptoms: burning, irritated .  Patient reports the following previous treatments: has seen numerous dermatologist and has tried multiple treatments. Only remembers a few-topical steroid, metrogel, and bacitracin with no change.  Patient reports the following modifying factors: none.  Associated symptoms: none.  Patient has no other skin complaints today.  Remainder of the HPI, Meds, PMH, Allergies, FH, and SH was reviewed in chart.    Pertinent Hx:   No personal or family history of skin cancer    Past Medical History:   Diagnosis Date     Cancer (H) skin cancer    removed     CARDIOVASCULAR SCREENING; LDL GOAL LESS THAN 160 10/31/2010     Celiac disease 9/8/2005     INSOMNIA NEC 9/8/2005     OSTEOPOROSIS NOS 9/8/2005     PNEUMONIA, ORGANISM NOS 12/5/2007       Past Surgical History:   Procedure Laterality Date     BIOPSY       BREAST SURGERY       COLONOSCOPY       GI SURGERY       HYSTERECTOMY, CERVIX STATUS UNKNOWN       HYSTERECTOMY, PAP NO LONGER INDICATED          Family History   Problem Relation Age of Onset     Osteoporosis Mother      Cerebrovascular Disease Father        Social History     Socioeconomic History     Marital status:      Spouse name: Not on file     Number of children: Not on file     Years of education: Not on file     Highest education level: Not on file   Occupational History     Not on file   Tobacco Use     Smoking status: Never Smoker     Smokeless tobacco: Never Used   Substance and Sexual Activity     Alcohol use: Yes     Comment:  social     Drug use: No     Sexual activity: Never   Other Topics Concern     Parent/sibling w/ CABG, MI or angioplasty before 65F 55M? No   Social History Narrative     Not on file     Social Determinants of Health     Financial Resource Strain: Not on file   Food Insecurity: Not on file   Transportation Needs: Not on file   Physical Activity: Not on file   Stress: Not on file   Social Connections: Not on file   Intimate Partner Violence: Not on file   Housing Stability: Not on file       Outpatient Encounter Medications as of 2/23/2022   Medication Sig Dispense Refill     alendronate (FOSAMAX) 70 MG tablet Take 1 tablet (70 mg) by mouth every 7 days 12 tablet 1     CALCIUM + D 600-200 MG-UNIT OR TABS 1 TABLET DAILY       mirtazapine (REMERON) 7.5 MG tablet Take 1 tablet (7.5 mg) by mouth At Bedtime 30 tablet 2     MULTIVITAL OR TABS 1 TABLET DAILY       [DISCONTINUED] ASPIRIN 81 MG OR TABS 1 TABLET DAILY (Patient not taking: No sig reported)       [DISCONTINUED] ferrous sulfate (FEROSUL) 325 (65 Fe) MG tablet Take 1 tablet (325 mg) by mouth 2 times daily With meals 60 tablet 0     No facility-administered encounter medications on file as of 2/23/2022.       Review Of Systems:  Skin: spot  Eyes: negative  Ears/Nose/Throat: negative  Respiratory: No shortness of breath, dyspnea on exertion, cough, or hemoptysis  Cardiovascular: negative  Gastrointestinal: negative  Genitourinary: negative  Musculoskeletal: negative  Neurologic: negative  Psychiatric: negative  Hematologic/Lymphatic/Immunologic: negative  Endocrine: negative      Objective:     BP (!) 163/68   Pulse 83   SpO2 100%   Eyes: Conjunctivae/lids: Normal   ENT: Lips:  Normal  MSK: Normal  Cardiovascular: Peripheral edema none  Pulm: Breathing Normal  Neuro/Psych: Orientation: A/O x 3. Normal; Mood/Affect: Normal, NAD, WDWN  Pt accompanied by: self  Following areas examined: face, neck, pt wearing mask.  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  No complications and routine wound care.  May grow back and will get a scar. Based on lesion type may need to completely remove lesion. Patient will be notified in 7-10 days of results. Wound care directions given.  Begin vaseline on right lateral eyebrow    Reviewed patient chart from 12/15/21 pcp.  Signs and Symptoms of non-melanoma skin cancer and ABCDEs of melanoma reviewed with patient. Wear a sunscreen with at least SPF 30 on your face, ears, neck and V of the chest daily. Wear sunscreen on other areas of the body if those areas are exposed to the sun throughout the day. Sunscreens can contain physical and/or chemical blockers. Physical blockers are less likely to clog pores, these include zinc oxide and titanium dioxide. Reapply every two hour and after swimming.Sunscreen examples: https://www.ewg.org/sunscreen/    Proper skin care from [facility]:    -Eliminate harsh soaps as they strip the natural oils from the skin, often resulting in dry itchy skin ( i.e. Dial, Zest, [place])  -Use mild soaps such as Cetaphil or Dove Sensitive Skin in the shower. You do not need to use soap on arms, legs, and trunk every time you shower unless visibly soiled.   -Avoid hot or cold showers.  -After showering, lightly dry off and apply moisturizing within 2-3 minutes. This will help trap moisture in the skin.   -Aggressive use of a moisturizer at least 1-2 times a day to the entire body (including -Vanicream, Cetaphil, Aquaphor or Cerave) and moisturize hands after every washing.  -We recommend using moisturizers that come in a tub that needs to be scooped out, not a pump. This has more of an oil base. It will hold moisture in your skin much better than a water  base moisturizer. The above recommended are non-pore clogging.         It was a pleasure speaking with Courtney Howe today.       Follow up in pending path        Again, thank you for allowing me to participate in the care of your patient.        Sincerely,        Tasia Parker PA-C

## 2022-02-23 NOTE — PROGRESS NOTES
HPI:  Courtney Howe is a 87 year old female patient here today for spot on right eyebreow .  Patient states this has been present for 10 years.  Patient reports the following symptoms: burning, irritated .  Patient reports the following previous treatments: has seen numerous dermatologist and has tried multiple treatments. Only remembers a few-topical steroid, metrogel, and bacitracin with no change.  Patient reports the following modifying factors: none.  Associated symptoms: none.  Patient has no other skin complaints today.  Remainder of the HPI, Meds, PMH, Allergies, FH, and SH was reviewed in chart.    Pertinent Hx:   No personal or family history of skin cancer    Past Medical History:   Diagnosis Date     Cancer (H) skin cancer    removed     CARDIOVASCULAR SCREENING; LDL GOAL LESS THAN 160 10/31/2010     Celiac disease 9/8/2005     INSOMNIA NEC 9/8/2005     OSTEOPOROSIS NOS 9/8/2005     PNEUMONIA, ORGANISM NOS 12/5/2007       Past Surgical History:   Procedure Laterality Date     BIOPSY       BREAST SURGERY       COLONOSCOPY       GI SURGERY       HYSTERECTOMY, CERVIX STATUS UNKNOWN       HYSTERECTOMY, PAP NO LONGER INDICATED          Family History   Problem Relation Age of Onset     Osteoporosis Mother      Cerebrovascular Disease Father        Social History     Socioeconomic History     Marital status:      Spouse name: Not on file     Number of children: Not on file     Years of education: Not on file     Highest education level: Not on file   Occupational History     Not on file   Tobacco Use     Smoking status: Never Smoker     Smokeless tobacco: Never Used   Substance and Sexual Activity     Alcohol use: Yes     Comment: social     Drug use: No     Sexual activity: Never   Other Topics Concern     Parent/sibling w/ CABG, MI or angioplasty before 65F 55M? No   Social History Narrative     Not on file     Social Determinants of Health     Financial Resource Strain: Not on file   Food  Insecurity: Not on file   Transportation Needs: Not on file   Physical Activity: Not on file   Stress: Not on file   Social Connections: Not on file   Intimate Partner Violence: Not on file   Housing Stability: Not on file       Outpatient Encounter Medications as of 2/23/2022   Medication Sig Dispense Refill     alendronate (FOSAMAX) 70 MG tablet Take 1 tablet (70 mg) by mouth every 7 days 12 tablet 1     CALCIUM + D 600-200 MG-UNIT OR TABS 1 TABLET DAILY       mirtazapine (REMERON) 7.5 MG tablet Take 1 tablet (7.5 mg) by mouth At Bedtime 30 tablet 2     MULTIVITAL OR TABS 1 TABLET DAILY       [DISCONTINUED] ASPIRIN 81 MG OR TABS 1 TABLET DAILY (Patient not taking: No sig reported)       [DISCONTINUED] ferrous sulfate (FEROSUL) 325 (65 Fe) MG tablet Take 1 tablet (325 mg) by mouth 2 times daily With meals 60 tablet 0     No facility-administered encounter medications on file as of 2/23/2022.       Review Of Systems:  Skin: spot  Eyes: negative  Ears/Nose/Throat: negative  Respiratory: No shortness of breath, dyspnea on exertion, cough, or hemoptysis  Cardiovascular: negative  Gastrointestinal: negative  Genitourinary: negative  Musculoskeletal: negative  Neurologic: negative  Psychiatric: negative  Hematologic/Lymphatic/Immunologic: negative  Endocrine: negative      Objective:     BP (!) 163/68   Pulse 83   SpO2 100%   Eyes: Conjunctivae/lids: Normal   ENT: Lips:  Normal  MSK: Normal  Cardiovascular: Peripheral edema none  Pulm: Breathing Normal  Neuro/Psych: Orientation: A/O x 3. Normal; Mood/Affect: Normal, NAD, WDWN  Pt accompanied by: self  Following areas examined: face, neck, pt wearing mask.  Coombs skin type:ii   Findings:  Pink scaly crusted patch on right lateral eyebrow 3.0cm    Assessment and Plan:     1) Neoplasm of uncertain behavior right lateral eyebrow 3.0cm  Dermatitis vs NMSC  TANGENTIAL BIOPSY:  After consent, anesthesia with LEC and prep, tangential biopsy performed.  No complications  and routine wound care.  May grow back and will get a scar. Based on lesion type may need to completely remove lesion. Patient will be notified in 7-10 days of results. Wound care directions given.  Begin vaseline on right lateral eyebrow    Reviewed patient chart from 12/15/21 pcp.  Signs and Symptoms of non-melanoma skin cancer and ABCDEs of melanoma reviewed with patient. Wear a sunscreen with at least SPF 30 on your face, ears, neck and V of the chest daily. Wear sunscreen on other areas of the body if those areas are exposed to the sun throughout the day. Sunscreens can contain physical and/or chemical blockers. Physical blockers are less likely to clog pores, these include zinc oxide and titanium dioxide. Reapply every two hour and after swimming.Sunscreen examples: https://www.ewg.org/sunscreen/    Proper skin care from [facility]:    -Eliminate harsh soaps as they strip the natural oils from the skin, often resulting in dry itchy skin ( i.e. Dial, Zest, [place] Spring)  -Use mild soaps such as Cetaphil or Dove Sensitive Skin in the shower. You do not need to use soap on arms, legs, and trunk every time you shower unless visibly soiled.   -Avoid hot or cold showers.  -After showering, lightly dry off and apply moisturizing within 2-3 minutes. This will help trap moisture in the skin.   -Aggressive use of a moisturizer at least 1-2 times a day to the entire body (including -Vanicream, Cetaphil, Aquaphor or Cerave) and moisturize hands after every washing.  -We recommend using moisturizers that come in a tub that needs to be scooped out, not a pump. This has more of an oil base. It will hold moisture in your skin much better than a water base moisturizer. The above recommended are non-pore clogging.         It was a pleasure speaking with Courtney Howe today.       Follow up in pending path

## 2022-02-28 ENCOUNTER — TELEPHONE (OUTPATIENT)
Dept: DERMATOLOGY | Facility: CLINIC | Age: 87
End: 2022-02-28
Payer: COMMERCIAL

## 2022-02-28 LAB
PATH REPORT.COMMENTS IMP SPEC: ABNORMAL
PATH REPORT.COMMENTS IMP SPEC: YES
PATH REPORT.FINAL DX SPEC: ABNORMAL
PATH REPORT.GROSS SPEC: ABNORMAL
PATH REPORT.MICROSCOPIC SPEC OTHER STN: ABNORMAL
PATH REPORT.RELEVANT HX SPEC: ABNORMAL

## 2022-02-28 NOTE — TELEPHONE ENCOUNTER
----- Message from Tasia Parker PA-C sent at 2/28/2022 12:42 PM CST -----  Right lateral eyebrow:  - Squamous cell carcinoma, at least in situ ----mohs

## 2022-02-28 NOTE — TELEPHONE ENCOUNTER
Called and spoke to patient.    Educated patient on biopsy results- SCCIS (mohs).    Educated patient on SCCIS, mohs, scheduled mohs appointment, and letter/packet sent.    Patient voiced understanding.    Nidia RN-BSN-PHN  [facility]  [phone]

## 2022-02-28 NOTE — LETTER
[facility] [place]  [address]  [id number] [phone]    2/28/2022       Courtney Howe  [address] [address]      Dear Courtney:    You are scheduled for Mohs Surgery on: 4/28/2022 @8:15am.    Please check in at [facility], [address].     You don't need to arrive more than 5-10 minutes prior to your appointment time.     Be sure to eat a good breakfast and bathe and wash your hair prior to surgery.     If you are taking any anti-coagulants that are prescribed by your Doctor (such as Coumadin/Warfarin, Plavix, Aspirin, Ibuprofen), please continue taking them.     However, if you are taking anti-coagulants over the counter without a Doctor's order for a medical condition, please discontinue them 10 days prior to surgery.     Please wear loose comfortable clothing as it could possibly be 4-6 hours until your surgery is completed depending upon how many layers of tissue need to be removed.      Thank you,    CATHERINE Mcdaniel MD

## 2022-04-27 NOTE — PROGRESS NOTES
Surgical Office Location:  [facility]  [address] [place] [id number]

## 2022-04-28 ENCOUNTER — OFFICE VISIT (OUTPATIENT)
Dept: DERMATOLOGY | Facility: CLINIC | Age: 87
End: 2022-04-28
Payer: COMMERCIAL

## 2022-04-28 VITALS — DIASTOLIC BLOOD PRESSURE: 63 MMHG | HEART RATE: 80 BPM | OXYGEN SATURATION: 100 % | SYSTOLIC BLOOD PRESSURE: 125 MMHG

## 2022-04-28 DIAGNOSIS — D04.10 SQUAMOUS CELL CARCINOMA IN SITU (SCCIS) OF SKIN OF RIGHT EYELID: Primary | ICD-10-CM

## 2022-04-28 PROCEDURE — 17311 MOHS 1 STAGE H/N/HF/G: CPT | Performed by: DERMATOLOGY

## 2022-04-28 PROCEDURE — 99207 PR NO CHARGE LOS: CPT | Performed by: DERMATOLOGY

## 2022-04-28 PROCEDURE — 14061 TIS TRNFR E/N/E/L10.1-30SQCM: CPT | Performed by: DERMATOLOGY

## 2022-04-28 PROCEDURE — 17312 MOHS ADDL STAGE: CPT | Performed by: DERMATOLOGY

## 2022-04-28 NOTE — LETTER
4/28/2022         RE: Courtney Howe  [address]  [facility] [address]        Dear Colleague,    Thank you for referring your patient, Courtney Howe, to the [facility]. Please see a copy of my visit note below.    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  Patient has no other skin complaints today.  Remainder of the HPI, Meds, PMH, Allergies, FH, and SH was reviewed in chart.      Past Medical History:   Diagnosis Date     Cancer (H) skin cancer    removed     CARDIOVASCULAR SCREENING; LDL GOAL LESS THAN 160 10/31/2010     Celiac disease 09/08/2005     INSOMNIA NEC 09/08/2005     OSTEOPOROSIS NOS 09/08/2005     PNEUMONIA, ORGANISM NOS 12/05/2007     Squamous cell carcinoma of skin, unspecified        Past Surgical History:   Procedure Laterality Date     BIOPSY       BREAST SURGERY       COLONOSCOPY       GI SURGERY       HYSTERECTOMY, CERVIX STATUS UNKNOWN       HYSTERECTOMY, PAP NO LONGER INDICATED          Family History   Problem Relation Age of Onset     Osteoporosis Mother      Cerebrovascular Disease Father        Social History     Socioeconomic History     Marital status:      Spouse name: Not on file     Number of children: Not on file     Years of education: Not on file     Highest education level: Not on file   Occupational History     Not on file   Tobacco Use     Smoking status: Never Smoker     Smokeless tobacco: Never Used   Substance and Sexual Activity     Alcohol use: Yes     Comment: social     Drug use: No     Sexual activity: Never   Other Topics Concern     Parent/sibling w/ CABG, MI or angioplasty before 65F 55M? No   Social History Narrative     Not on file     Social Determinants of Health      Financial Resource Strain: Not on file   Food Insecurity: Not on file   Transportation Needs: Not on file   Physical Activity: Not on file   Stress: Not on file   Social Connections: Not on file   Intimate Partner Violence: Not on file   Housing Stability: Not on file       Outpatient Encounter Medications as of 4/28/2022   Medication Sig Dispense Refill     alendronate (FOSAMAX) 70 MG tablet Take 1 tablet (70 mg) by mouth every 7 days 12 tablet 1     CALCIUM + D 600-200 MG-UNIT OR TABS 1 TABLET DAILY       mirtazapine (REMERON) 7.5 MG tablet Take 1 tablet (7.5 mg) by mouth At Bedtime 30 tablet 2     MULTIVITAL OR TABS 1 TABLET DAILY       No facility-administered encounter medications on file as of 4/28/2022.             O:   NAD, WDWN, Alert & Oriented, Mood & Affect wnl, Vitals stable   Here today alone   /63   Pulse 80   SpO2 100%   Breastfeeding No    General appearance normal   Vitals stable   Alert, oriented and in no acute distress     R brow 3cm red plaque       Eyes: Conjunctivae/lids:Normal     ENT: Lips, buccal mucosa, tongue: normal    MSK:Normal    Cardiovascular: peripheral edema none    Pulm: Breathing Normal    Lymph Nodes: No Head and Neck Lymphadenopathy     Neuro/Psych: Orientation:Alert and Orientedx3 ; Mood/Affect:normal       A/P:  1. Rbrow squamous cell carcinoma in situ   MOHS:   Location    The rationale for Mohs surgery was discussed with the patient and consent was obtained.  The risks and benefits as well as alternatives to therapy were discussed, in detail.  Specifically, the risks of infection, scarring, bleeding, prolonged wound healing, incomplete removal, allergy to anesthesia, nerve injury and recurrence were addressed.  Indication for Mohs was Location. Prior to the procedure, the treatment site was clearly identified and, if available, confirmed with previous photos and confirmed by the patient   All components of the Universal Protocol/PAUSE rule were completed.   The Mohs surgeon operated in two distinct and integrated capacities as the surgeon and pathologist.      The area was prepped with Betasept.  A rim of normal appearing skin was marked circumferentially around the lesion.  The area was infiltrated with local anesthesia.  The tumor was first debulked to remove all clinically apparent tumor.  An incision following the standard Mohs approach was done and the specimen was oriented,mapped and placed in 3 block(s).  Each specimen was then chromacoded and processed in the Mohs laboratory using standard Mohs technique and submitted for frozen section histology.  Frozen section analysis showed  residual tumor but CLEAR MARGINS.    First stage:There is hyperkeratosis & parakeratosis of the epidermis, with full thickness epidermal involvement by atypical keratinocytes with rare pale vacuolated cells.  Unremarkable dermis.      The tumor was excised using standard Mohs technique in 2 stages(s).  CLEAR MARGINS OBTAINED and Final defect size was 3.7 x 3.4 cm.     We discussed the options for wound management in full with the patient including risks/benefits/ possible outcomes.        REPAIR IPF: Because of the Because of the size and full thickness nature of the defect, Because of the tightness of the surrounding skin, To maintain form and function and Because of the proximity to the lid, a laterally -based island pedicle flap was carefully planned. After LEC anesthesia and prep, a triangular flap was incised and a vascularized pedicle was developed deep to the flap by careful undermining and dissection. The surrounding skin was widely undermined and hemostasis was obtained. The flap was then advanced into the defect and sutured into place in a a layered fashion using Vicryl and Fast Absorbing Plain Gut sutures. Postoperative size was 5.5x2.5 cm.  EBL minimal; complications none; wound care routine.  The patient was discharged in good condition and will return in one week for  wound evaluation.   It was a pleasure speaking to Courtney Howe today.  Previous clinic notes and pertinent laboratory tests were reviewed prior to Courtney Howe's visit.  Nature and genetics of benign skin lesions dicussed with patient.  Signs and Symptoms of skin cancer discussed with patient.  Patient encouraged to perform monthly skin exams.  UV precautions reviewed with patient.  Risks of non-melanoma skin cancer discussed with patient   Return to clinic  1 months      Again, thank you for allowing me to participate in the care of your patient.        Sincerely,        Noam Mcdaniel MD

## 2022-04-28 NOTE — PATIENT INSTRUCTIONS
Sutured Wound Care     [facility]: [phone]    [facility]: [phone]          No strenuous activity for 48 hours. Resume moderate activity in 48 hours. No heavy exercising until you are seen for follow up in one week.     Take Tylenol as needed for discomfort.                         Do not drink alcoholic beverages for 48 hours.     Keep the pressure bandage in place for 24 hours. If the bandage becomes blood tinged or loose, reinforce it with gauze and tape.        (Refer to the reverse side of this page for management of bleeding).    Remove pressure bandage in 24 hours     Leave the flat bandage in place until your follow up appointment.    Keep the bandage dry. Wash around it carefully.    If the tape becomes soiled or starts to come off, reinforce it with additional paper tape.    Do not smoke for 3 weeks; smoking is detrimental to wound healing.    It is normal to have swelling and bruising around the surgical site. The bruising will fade in approximately 10-14 days. Elevate the area to reduce swelling.    Numbness, itchiness and sensitivity to temperature changes can occur after surgery and may take up to 18 months to normalize.                    POSSIBLE COMPLICATIONS    BLEEDING:    Leave the bandage in place.Use tightly rolled up gauze or a cloth to apply direct pressure over the bandage for 20 minutes.  Reapply pressure for an additional 20 minutes if necessary  Call the office or go to the nearest emergency room if pressure fails to stop the bleeding.  Use additional gauze and tape to maintain pressure once the bleeding has stopped.        PAIN:    Post operative pain should slowly get better, never worse.  A severe increase in pain may indicate a problem. Call the office if this occurs.    In case of emergency phone:Dr Mcdaniel [phone]

## 2022-04-28 NOTE — PROGRESS NOTES
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  Patient has no other skin complaints today.  Remainder of the HPI, Meds, PMH, Allergies, FH, and SH was reviewed in chart.      Past Medical History:   Diagnosis Date     Cancer (H) skin cancer    removed     CARDIOVASCULAR SCREENING; LDL GOAL LESS THAN 160 10/31/2010     Celiac disease 09/08/2005     INSOMNIA NEC 09/08/2005     OSTEOPOROSIS NOS 09/08/2005     PNEUMONIA, ORGANISM NOS 12/05/2007     Squamous cell carcinoma of skin, unspecified        Past Surgical History:   Procedure Laterality Date     BIOPSY       BREAST SURGERY       COLONOSCOPY       GI SURGERY       HYSTERECTOMY, CERVIX STATUS UNKNOWN       HYSTERECTOMY, PAP NO LONGER INDICATED          Family History   Problem Relation Age of Onset     Osteoporosis Mother      Cerebrovascular Disease Father        Social History     Socioeconomic History     Marital status:      Spouse name: Not on file     Number of children: Not on file     Years of education: Not on file     Highest education level: Not on file   Occupational History     Not on file   Tobacco Use     Smoking status: Never Smoker     Smokeless tobacco: Never Used   Substance and Sexual Activity     Alcohol use: Yes     Comment: social     Drug use: No     Sexual activity: Never   Other Topics Concern     Parent/sibling w/ CABG, MI or angioplasty before 65F 55M? No   Social History Narrative     Not on file     Social Determinants of Health     Financial Resource Strain: Not on file   Food Insecurity: Not on file   Transportation Needs: Not on file   Physical Activity: Not on file   Stress: Not on file   Social Connections: Not on file   Intimate Partner Violence: Not on file   Housing Stability: Not on file       Outpatient Encounter Medications as of 4/28/2022   Medication Sig Dispense Refill     alendronate  (FOSAMAX) 70 MG tablet Take 1 tablet (70 mg) by mouth every 7 days 12 tablet 1     CALCIUM + D 600-200 MG-UNIT OR TABS 1 TABLET DAILY       mirtazapine (REMERON) 7.5 MG tablet Take 1 tablet (7.5 mg) by mouth At Bedtime 30 tablet 2     MULTIVITAL OR TABS 1 TABLET DAILY       No facility-administered encounter medications on file as of 4/28/2022.             O:   NAD, WDWN, Alert & Oriented, Mood & Affect wnl, Vitals stable   Here today alone   /63   Pulse 80   SpO2 100%   Breastfeeding No    General appearance normal   Vitals stable   Alert, oriented and in no acute distress     R brow 3cm red plaque       Eyes: Conjunctivae/lids:Normal     ENT: Lips, buccal mucosa, tongue: normal    MSK:Normal    Cardiovascular: peripheral edema none    Pulm: Breathing Normal    Lymph Nodes: No Head and Neck Lymphadenopathy     Neuro/Psych: Orientation:Alert and Orientedx3 ; Mood/Affect:normal       A/P:  1. Rbrow squamous cell carcinoma in situ   MOHS:   Location    The rationale for Mohs surgery was discussed with the patient and consent was obtained.  The risks and benefits as well as alternatives to therapy were discussed, in detail.  Specifically, the risks of infection, scarring, bleeding, prolonged wound healing, incomplete removal, allergy to anesthesia, nerve injury and recurrence were addressed.  Indication for Mohs was Location. Prior to the procedure, the treatment site was clearly identified and, if available, confirmed with previous photos and confirmed by the patient   All components of the Universal Protocol/PAUSE rule were completed.  The Mohs surgeon operated in two distinct and integrated capacities as the surgeon and pathologist.      The area was prepped with Betasept.  A rim of normal appearing skin was marked circumferentially around the lesion.  The area was infiltrated with local anesthesia.  The tumor was first debulked to remove all clinically apparent tumor.  An incision following the standard  Mohs approach was done and the specimen was oriented,mapped and placed in 3 block(s).  Each specimen was then chromacoded and processed in the Mohs laboratory using standard Mohs technique and submitted for frozen section histology.  Frozen section analysis showed  residual tumor but CLEAR MARGINS.    First stage:There is hyperkeratosis & parakeratosis of the epidermis, with full thickness epidermal involvement by atypical keratinocytes with rare pale vacuolated cells.  Unremarkable dermis.      The tumor was excised using standard Mohs technique in 2 stages(s).  CLEAR MARGINS OBTAINED and Final defect size was 3.7 x 3.4 cm.     We discussed the options for wound management in full with the patient including risks/benefits/ possible outcomes.        REPAIR IPF: Because of the Because of the size and full thickness nature of the defect, Because of the tightness of the surrounding skin, To maintain form and function and Because of the proximity to the lid, a laterally -based island pedicle flap was carefully planned. After LEC anesthesia and prep, a triangular flap was incised and a vascularized pedicle was developed deep to the flap by careful undermining and dissection. The surrounding skin was widely undermined and hemostasis was obtained. The flap was then advanced into the defect and sutured into place in a a layered fashion using Vicryl and Fast Absorbing Plain Gut sutures. Postoperative size was 5.5x2.5 cm.  EBL minimal; complications none; wound care routine.  The patient was discharged in good condition and will return in one week for wound evaluation.   It was a pleasure speaking to Courtney Howe today.  Previous clinic notes and pertinent laboratory tests were reviewed prior to Courtney Howe's visit.  Nature and genetics of benign skin lesions dicussed with patient.  Signs and Symptoms of skin cancer discussed with patient.  Patient encouraged to perform monthly skin exams.  UV precautions reviewed  with patient.  Risks of non-melanoma skin cancer discussed with patient   Return to clinic  1 months

## 2022-05-04 ENCOUNTER — ALLIED HEALTH/NURSE VISIT (OUTPATIENT)
Dept: DERMATOLOGY | Facility: CLINIC | Age: 87
End: 2022-05-04
Payer: COMMERCIAL

## 2022-05-04 DIAGNOSIS — Z48.01 DRESSING CHANGE OR REMOVAL, SURGICAL WOUND: Primary | ICD-10-CM

## 2022-05-04 PROCEDURE — 99207 PR NO CHARGE NURSE ONLY: CPT

## 2022-05-04 NOTE — NURSING NOTE
Courtney Howe comes into clinic today at the request of [facility]  Ordering Provider for dressing change.      This service provided today was under the supervising provider of the day Tasia Parker, MS, PA-C  , who was available if needed.    Jayna Mercado RN   Please see providers note on 4/28/22 for orders  Patient returned to clinic for post surgery 1 week follow up bandage change. Patient has no complaints, denies pain.  Bandage removed from supra brow. Area cleansed with wound cleanser. Site is healing with no signs of infection, wound edges approximating well.   Reapplied new steri strips and paper tape.     Advised to watch for signs and symptons of infection; spreading redness, increasing pain, drainage, odor, fever.   Call or report promptly to clinic if any of these symptoms are present.    Wound care post op instructions given and discussed for 14 day bandage removal and continued incision/scar care.    Patient verbalized understanding.       Susan STAFFORD RN BSN  [facility]  [phone]

## 2022-05-09 ENCOUNTER — MYC MEDICAL ADVICE (OUTPATIENT)
Dept: INTERNAL MEDICINE | Facility: CLINIC | Age: 87
End: 2022-05-09
Payer: COMMERCIAL

## 2022-05-09 DIAGNOSIS — G47.00 INSOMNIA, UNSPECIFIED TYPE: Primary | ICD-10-CM

## 2022-05-10 NOTE — TELEPHONE ENCOUNTER
Please see attached MC and advise    Pt requesting to restart Amitriptyline, states trazodone and mirtazapine are not helping with sleep.     Princess Nicholas RN

## 2022-05-11 NOTE — PATIENT INSTRUCTIONS
WOUND CARE INSTRUCTIONS  for  ONE WEEK AFTER SURGERY              Leave flat bandage on your skin for one week after today s bandage change.  In one week when you remove the bandage, you may resume your regular skin care routine, including washing with mild soap and water, applying moisturizer, make-up and sunscreen.     If there are any open or bleeding areas at the incision/graft site you should begin to cover the area with a bandage daily as follows:     Clean and dry the area with plain tap water using a Q-tip or sterile gauze pad.  Apply Polysporin or Bacitracin ointment to the open area.  Cover the wound with a band-aid or a sterile non-stick gauze pad and micropore paper tape.                  *Once the bandages are removed, the scar will be red and firm (especially in the lip/chin area). This is normal and will fade in time. It might take 6-12 months for this to happen.      *Massaging the area will help the scar soften and fade quicker. Begin to massage the area one month after the bandages have been removed. To massage apply pressure directly and firmly over the scar with the fingertips and move in a circular motion. Massage the area for a few minutes several times a day. Continue to massage the site for several months.     *Approximately 6-8 weeks after surgery it is not uncommon to see the formation of  tender pimple-like  bump along the scar. This is normal. As the scar continues to mature and the stitches underneath the skin begin to dissolve, this might occur. Do not pick or squeeze, this will resolve on it s own. Should one break open producing a small amount of drainage, apply Polysporin or Bacitracin ointment a few times a day until the wound is completely healed.     *Numbness in the surgical area is expected. It might take 12-18 months for the feeling to return to normal. During this time sensations of itchiness, tingling and occasional sharp pains might be noted. These feelings are normal and  will subside once the nerves have completely healed.               Partial Purse String (Intermediate) Text: Given the location of the defect and the characteristics of the surrounding skin an intermediate purse string closure was deemed most appropriate.  Undermining was performed circumferentially around the surgical defect.  A purse string suture was then placed and tightened. Wound tension of the circular defect prevented complete closure of the wound.

## 2022-06-13 DIAGNOSIS — M81.0 OSTEOPOROSIS, UNSPECIFIED OSTEOPOROSIS TYPE, UNSPECIFIED PATHOLOGICAL FRACTURE PRESENCE: ICD-10-CM

## 2022-06-15 RX ORDER — ALENDRONATE SODIUM 70 MG/1
70 TABLET ORAL
Qty: 12 TABLET | Refills: 0 | Status: SHIPPED | OUTPATIENT
Start: 2022-06-15 | End: 2022-09-08

## 2022-06-15 NOTE — TELEPHONE ENCOUNTER
Routing refill request to provider for review/approval because:  Labs out of range:    Creatinine   Date Value Ref Range Status   11/24/2021 0.48 (L) 0.52 - 1.04 mg/dL Final   07/15/2020 0.48 (L) 0.52 - 1.04 mg/dL Final       Princess Nicholas RN

## 2022-07-27 DIAGNOSIS — G47.00 INSOMNIA, UNSPECIFIED TYPE: ICD-10-CM

## 2022-08-31 DIAGNOSIS — M81.0 OSTEOPOROSIS, UNSPECIFIED OSTEOPOROSIS TYPE, UNSPECIFIED PATHOLOGICAL FRACTURE PRESENCE: ICD-10-CM

## 2022-08-31 NOTE — LETTER
[facility]  [address] [id number] [phone]            Courtney Howe  [address] [address]        September 1, 2022    Dear Courtney,    Your recent refill request for alendronate (FOSAMAX) 70 MG tablet has been denied, as you are overdue for a visit with your provider and labs. Please give us a call at [phone], or [facility]       [facility]

## 2022-09-01 RX ORDER — ALENDRONATE SODIUM 70 MG/1
70 TABLET ORAL
Qty: 12 TABLET | Refills: 0 | OUTPATIENT
Start: 2022-09-01

## 2022-09-01 NOTE — TELEPHONE ENCOUNTER
Routing refill request to provider for review/approval because:   Dexa on file within past 2 years    Normal serum creatinine on file within past 12 months       Jaron Wall, RN  [facility] Triage Nurse

## 2022-09-08 ENCOUNTER — VIRTUAL VISIT (OUTPATIENT)
Dept: INTERNAL MEDICINE | Facility: CLINIC | Age: 87
End: 2022-09-08
Payer: COMMERCIAL

## 2022-09-08 DIAGNOSIS — G47.00 INSOMNIA, UNSPECIFIED TYPE: ICD-10-CM

## 2022-09-08 DIAGNOSIS — D50.9 IRON DEFICIENCY ANEMIA, UNSPECIFIED IRON DEFICIENCY ANEMIA TYPE: Primary | ICD-10-CM

## 2022-09-08 DIAGNOSIS — M81.0 OSTEOPOROSIS, UNSPECIFIED OSTEOPOROSIS TYPE, UNSPECIFIED PATHOLOGICAL FRACTURE PRESENCE: ICD-10-CM

## 2022-09-08 PROCEDURE — 99442 PR PHYSICIAN TELEPHONE EVALUATION 11-20 MIN: CPT | Mod: 95 | Performed by: INTERNAL MEDICINE

## 2022-09-08 RX ORDER — ALENDRONATE SODIUM 70 MG/1
70 TABLET ORAL
Qty: 12 TABLET | Refills: 1 | Status: SHIPPED | OUTPATIENT
Start: 2022-09-08 | End: 2023-02-21

## 2022-10-23 ENCOUNTER — HEALTH MAINTENANCE LETTER (OUTPATIENT)
Age: 87
End: 2022-10-23

## 2022-12-09 ENCOUNTER — TELEPHONE (OUTPATIENT)
Dept: DERMATOLOGY | Facility: CLINIC | Age: 87
End: 2022-12-09

## 2022-12-09 NOTE — TELEPHONE ENCOUNTER
[facility] Call Center    Phone Message    May a detailed message be left on voicemail: yes     Reason for Call: Symptoms or Concerns     If patient has red-flag symptoms, warm transfer to triage line    Current symptom or concern: Pt said she thinks it is cancer on her nose, it is crusty.     Symptoms have been present for:  2 month(s)    Has patient previously been seen for this? No    By : NA    Date: NA    Are there any new or worsening symptoms? No - staying the same      Action Taken: Message routed to:  [facility] Derm    Travel Screening: Not Applicable

## 2022-12-10 ENCOUNTER — HEALTH MAINTENANCE LETTER (OUTPATIENT)
Age: 87
End: 2022-12-10

## 2022-12-12 NOTE — TELEPHONE ENCOUNTER
Called and scheduled sooner appt with Dr. Mcdaniel.    Jaclyn FLORES RN  Dermatology   [phone]

## 2023-02-02 ENCOUNTER — TELEPHONE (OUTPATIENT)
Dept: DERMATOLOGY | Facility: CLINIC | Age: 88
End: 2023-02-02
Payer: COMMERCIAL

## 2023-02-02 NOTE — TELEPHONE ENCOUNTER
[facility] Call Center    Phone Message    May a detailed message be left on voicemail: yes     Reason for Call: Other: Pt called and will need to come in for spots on her face along with a blister on her toe. Per protocol, the clinic will need to review it. Please call her back. Thanks     Action Taken: Message routed to:  Other: OX DERM    Travel Screening: Not Applicable

## 2023-02-02 NOTE — TELEPHONE ENCOUNTER
Pt called back and scheduled with Dr. Feliciano on Wednesday 2/15 at 1:45 pm.    Lina STEWART RN  [facility] [place]  [phone]

## 2023-02-02 NOTE — TELEPHONE ENCOUNTER
Left message for pt to call clinic back at [phone] and ask to speak with dermatology nurse.    Lina STEWART RN  [facility] [place]  [phone]

## 2023-02-21 DIAGNOSIS — M81.0 OSTEOPOROSIS, UNSPECIFIED OSTEOPOROSIS TYPE, UNSPECIFIED PATHOLOGICAL FRACTURE PRESENCE: ICD-10-CM

## 2023-02-21 RX ORDER — ALENDRONATE SODIUM 70 MG/1
70 TABLET ORAL
Qty: 12 TABLET | Refills: 0 | Status: SHIPPED | OUTPATIENT
Start: 2023-02-21 | End: 2023-02-22

## 2023-02-21 NOTE — LETTER
[facility]  [address], [place] [address] [phone]  February 22, 2023  Courtney Howe  [address] [address]    Dear Courtney,    I am contacting you regarding the refill request we received for you. After reviewing your chart it looks like you are overdue for your annual and for a med check. Please call [phone] or schedule this through my chart to continue to receive refills. If you anticipate running out before your appointment let us know and we can send in a carl refill.       Thank you,     [facility] nursing staff

## 2023-02-21 NOTE — TELEPHONE ENCOUNTER
Prescription filled for Fosamax but patient should be seen in the follow-up to discuss repeat bone density scan.

## 2023-02-22 ENCOUNTER — E-VISIT (OUTPATIENT)
Dept: INTERNAL MEDICINE | Facility: CLINIC | Age: 88
End: 2023-02-22
Payer: COMMERCIAL

## 2023-02-22 DIAGNOSIS — M81.0 OSTEOPOROSIS, UNSPECIFIED OSTEOPOROSIS TYPE, UNSPECIFIED PATHOLOGICAL FRACTURE PRESENCE: ICD-10-CM

## 2023-02-22 PROCEDURE — 99207 PR NON-BILLABLE SERV PER CHARTING: CPT | Performed by: INTERNAL MEDICINE

## 2023-02-22 RX ORDER — ALENDRONATE SODIUM 70 MG/1
70 TABLET ORAL
Qty: 12 TABLET | Refills: 1 | Status: SHIPPED | OUTPATIENT
Start: 2023-02-22 | End: 2023-04-27

## 2023-03-02 ENCOUNTER — ANCILLARY PROCEDURE (OUTPATIENT)
Dept: BONE DENSITY | Facility: CLINIC | Age: 88
End: 2023-03-02
Attending: INTERNAL MEDICINE
Payer: COMMERCIAL

## 2023-03-02 DIAGNOSIS — M81.0 OSTEOPOROSIS, UNSPECIFIED OSTEOPOROSIS TYPE, UNSPECIFIED PATHOLOGICAL FRACTURE PRESENCE: ICD-10-CM

## 2023-03-02 PROCEDURE — 77085 DXA BONE DENSITY AXL VRT FX: CPT | Performed by: INTERNAL MEDICINE

## 2023-04-24 ENCOUNTER — OFFICE VISIT (OUTPATIENT)
Dept: DERMATOLOGY | Facility: CLINIC | Age: 88
End: 2023-04-24
Payer: COMMERCIAL

## 2023-04-24 DIAGNOSIS — D48.5 NEOPLASM OF UNCERTAIN BEHAVIOR OF SKIN: ICD-10-CM

## 2023-04-24 DIAGNOSIS — Z86.007 HISTORY OF SQUAMOUS CELL CARCINOMA IN SITU: Primary | ICD-10-CM

## 2023-04-24 DIAGNOSIS — L57.8 ACTINIC SKIN DAMAGE: ICD-10-CM

## 2023-04-24 PROCEDURE — 88305 TISSUE EXAM BY PATHOLOGIST: CPT | Performed by: DERMATOLOGY

## 2023-04-24 PROCEDURE — 88313 SPECIAL STAINS GROUP 2: CPT | Performed by: DERMATOLOGY

## 2023-04-24 PROCEDURE — 11102 TANGNTL BX SKIN SINGLE LES: CPT | Performed by: STUDENT IN AN ORGANIZED HEALTH CARE EDUCATION/TRAINING PROGRAM

## 2023-04-24 PROCEDURE — 99213 OFFICE O/P EST LOW 20 MIN: CPT | Mod: 25 | Performed by: STUDENT IN AN ORGANIZED HEALTH CARE EDUCATION/TRAINING PROGRAM

## 2023-04-24 NOTE — PATIENT INSTRUCTIONS

## 2023-04-24 NOTE — PROGRESS NOTES
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    Diagnoses and all orders for this visit:    History of squamous cell carcinoma in situ  - No obvious evidence of recurrence at site of previous malignancy  - discussed sunprotective behaviors, clothing, and the use of sunscreen    Neoplasm of uncertain behavior of skin  -     Dermatological Path Order and Indications; Standing  -     SHAVE 1 [[id number]]  - mucinous plaque w/ arborizing vessels overlying in 89y/o. Unusual location w/ appearance c/w BCC vs digital mucous cyst (central punctum present at bottom of the bx)            Actinic skin damage  - discussed sunprotective behaviors, clothing, and the use of sunscreen    Other orders  -     Dermatological Path Order and Indications        - SHAVE BIOPSY PROCEDURE NOTE: After written informed consent was obtained, a time out was taken to identify the patient and the correct site for biopsy. The lesion on the R great toe was cleansed with a 70% isopropyl alcohol wipe, and then injected with 2cc of lidocaine 1% with epinephrine 1:100,000. Once anesthesia was ensured, the visible surface of the lesion was biopsied using a Mague blade in standard technique. Hemostasis was obtained with pressure and aluminum chloride 20% solution. The specimen was placed in a labeled formalin container and sent to pathology for sectioning and analysis. The wound was dressed with white petrolatum and an adhesive bandage. The patient tolerated the procedure well. Post-procedure instructions and recommendations were provided both verbally and in writing.    Follow-up after results .       Staff Involved:  Staff Only    Don Feliciano MD   of Dermatology  Department of Dermatology  [facility]  "[facility]      CC:   Chief Complaint   Patient presents with     Derm Problem     Scab on nose  Blister on foot        History of Present Illness:  Ms. Courtney Howe is a 88 year old female who presents as a return patient.    Has some scaly spots on her nose present for over a year. A \"blister\" on R foot on the side of hte big toe. Has had this since February     Labs:      Physical exam:  Vitals: There were no vitals taken for this visit.  GEN: well developed, well-nourished, in no acute distress, in a pleasant mood.     SKIN: Coombs phototype 1  - Focused examination of the face, forearms and R foot was performed.  - Flat brown macules and patches in a sun exposed areas on face and extremities  - No obvious evidence of recurrence at site of previous malignancy  - mucinous plaque w/ overlying vessels lateral r great toe   - No other lesions of concern on areas examined.     Past Medical History:   Past Medical History:   Diagnosis Date     Cancer (H) skin cancer    removed     CARDIOVASCULAR SCREENING; LDL GOAL LESS THAN 160 10/31/2010     Celiac disease 09/08/2005     INSOMNIA NEC 09/08/2005     OSTEOPOROSIS NOS 09/08/2005     PNEUMONIA, ORGANISM NOS 12/05/2007     Squamous cell carcinoma of skin, unspecified      Past Surgical History:   Procedure Laterality Date     BIOPSY       BREAST SURGERY       COLONOSCOPY       GI SURGERY       HYSTERECTOMY, CERVIX STATUS UNKNOWN       HYSTERECTOMY, PAP NO LONGER INDICATED         Social History:   reports that she has never smoked. She has never used smokeless tobacco. She reports current alcohol use. She reports that she does not use drugs.    Family History:  Family History   Problem Relation Age of Onset     Osteoporosis Mother      Cerebrovascular Disease Father        Medications:  Current Outpatient Medications   Medication Sig Dispense Refill     alendronate (FOSAMAX) 70 MG tablet Take 1 tablet (70 mg) by mouth every 7 days 12 tablet 1 "     amitriptyline (ELAVIL) 25 MG tablet Take 1 tablet (25 mg) by mouth At Bedtime 90 tablet 1     CALCIUM + D 600-200 MG-UNIT OR TABS 1 TABLET DAILY       MULTIVITAL OR TABS 1 TABLET DAILY       Allergies   Allergen Reactions     Wheat Gluten [Gluten Meal] Diarrhea

## 2023-04-27 ENCOUNTER — OFFICE VISIT (OUTPATIENT)
Dept: INTERNAL MEDICINE | Facility: CLINIC | Age: 88
End: 2023-04-27
Payer: COMMERCIAL

## 2023-04-27 VITALS
DIASTOLIC BLOOD PRESSURE: 72 MMHG | RESPIRATION RATE: 13 BRPM | HEIGHT: 65 IN | OXYGEN SATURATION: 99 % | HEART RATE: 79 BPM | BODY MASS INDEX: 16.64 KG/M2 | TEMPERATURE: 97.7 F | SYSTOLIC BLOOD PRESSURE: 132 MMHG | WEIGHT: 99.9 LBS

## 2023-04-27 DIAGNOSIS — Z00.00 ENCOUNTER FOR SUBSEQUENT ANNUAL WELLNESS VISIT IN MEDICARE PATIENT: Primary | ICD-10-CM

## 2023-04-27 DIAGNOSIS — Z23 NEED FOR DIPHTHERIA-TETANUS-PERTUSSIS (TDAP) VACCINE: ICD-10-CM

## 2023-04-27 DIAGNOSIS — G47.00 INSOMNIA, UNSPECIFIED TYPE: ICD-10-CM

## 2023-04-27 DIAGNOSIS — Z13.6 CARDIOVASCULAR SCREENING; LDL GOAL LESS THAN 160: ICD-10-CM

## 2023-04-27 DIAGNOSIS — M81.0 OSTEOPOROSIS, UNSPECIFIED OSTEOPOROSIS TYPE, UNSPECIFIED PATHOLOGICAL FRACTURE PRESENCE: ICD-10-CM

## 2023-04-27 DIAGNOSIS — Z23 NEED FOR SHINGLES VACCINE: ICD-10-CM

## 2023-04-27 PROCEDURE — G0439 PPPS, SUBSEQ VISIT: HCPCS | Performed by: INTERNAL MEDICINE

## 2023-04-27 PROCEDURE — 99213 OFFICE O/P EST LOW 20 MIN: CPT | Mod: 25 | Performed by: INTERNAL MEDICINE

## 2023-04-27 RX ORDER — ALENDRONATE SODIUM 70 MG/1
70 TABLET ORAL
Qty: 12 TABLET | Refills: 3 | Status: SHIPPED | OUTPATIENT
Start: 2023-04-27 | End: 2024-04-21

## 2023-04-27 ASSESSMENT — ENCOUNTER SYMPTOMS
PALPITATIONS: 0
HEADACHES: 0
CONSTIPATION: 0
EYE PAIN: 0
HEMATOCHEZIA: 0
DYSURIA: 0
DIARRHEA: 0
COUGH: 0
FREQUENCY: 0
ABDOMINAL PAIN: 0
PARESTHESIAS: 0
SORE THROAT: 0
WEAKNESS: 0
CHILLS: 0
BREAST MASS: 0
NERVOUS/ANXIOUS: 0
HEARTBURN: 0
DIZZINESS: 0
ARTHRALGIAS: 0
MYALGIAS: 0
FEVER: 0
SHORTNESS OF BREATH: 0
HEMATURIA: 0
JOINT SWELLING: 0
NAUSEA: 0

## 2023-04-27 ASSESSMENT — ACTIVITIES OF DAILY LIVING (ADL): CURRENT_FUNCTION: NO ASSISTANCE NEEDED

## 2023-04-27 NOTE — PROGRESS NOTES
"SUBJECTIVE:   Courtney is a 88 year old who presents for Preventive Visit.  Patient has been advised of split billing requirements and indicates understanding: Yes  Are you in the first 12 months of your Medicare coverage?  No    Healthy Habits:     In general, how would you rate your overall health?  Good    Frequency of exercise:  None    Do you usually eat at least 4 servings of fruit and vegetables a day, include whole grains    & fiber and avoid regularly eating high fat or \"junk\" foods?  Yes    Taking medications regularly:  Yes    Medication side effects:  None    Ability to successfully perform activities of daily living:  No assistance needed    Home Safety:  No safety concerns identified    Hearing Impairment:  No hearing concerns    In the past 6 months, have you been bothered by leaking of urine?  No    In general, how would you rate your overall mental or emotional health?  Fair      PHQ-2 Total Score: 0    Additional concerns today:  No      Have you ever done Advance Care Planning? (For example, a Health Directive, POLST, or a discussion with a medical provider or your loved ones about your wishes): No, advance care planning information given to patient to review.  Patient declined advance care planning discussion at this time.       Fall risk:  low    Cognitive Screening   1) Repeat 3 items (Leader, Season, Table)    2) Clock draw: NORMAL  3) 3 item recall: Recalls 3 objects  Results: 3 items recalled: COGNITIVE IMPAIRMENT LESS LIKELY    Mini-CogTM Copyright KIEL Burger. Licensed by the author for use in [facility]; reprinted with permission (gustavo@.[facility]). All rights reserved.      Do you have sleep apnea, excessive snoring or daytime drowsiness?: no    Reviewed and updated as needed this visit by clinical staff   Tobacco  Allergies  Meds              Reviewed and updated as needed this visit by Provider                 Social History     Tobacco Use     Smoking status: Never     " Smokeless tobacco: Never   Vaping Use     Vaping status: Not on file   Substance Use Topics     Alcohol use: Yes     Comment: social           4/27/2023     9:06 AM   Alcohol Use   Prescreen: >3 drinks/day or >7 drinks/week? No     Do you have a current opioid prescription? No  Do you use any other controlled substances or medications that are not prescribed by a provider? None      Current Outpatient Medications   Medication     alendronate (FOSAMAX) 70 MG tablet     amitriptyline (ELAVIL) 25 MG tablet     CALCIUM + D 600-200 MG-UNIT OR TABS     MULTIVITAL OR TABS     No current facility-administered medications for this visit.     Current providers sharing in care for this patient include:   Patient Care Team:  Bassem Howe MD as PCP - General  Bassem Howe MD as Assigned PCP  Sravanthi Mclean RN as   Noam Mcdaniel MD as Assigned Surgical Provider  Don Feliciano MD as MD (Dermatology)    The following health maintenance items are reviewed in Epic and correct as of today:  Health Maintenance   Topic Date Due     ZOSTER IMMUNIZATION (2 of 3) 09/30/2009     DTAP/TDAP/TD IMMUNIZATION (1 - Tdap) 10/17/2018     COVID-19 Vaccine (5 - Booster for Pfizer series) 07/12/2022     INFLUENZA VACCINE (1) 09/01/2022     MEDICARE ANNUAL WELLNESS VISIT  11/09/2022     ANNUAL REVIEW OF HM ORDERS  11/09/2022     FALL RISK ASSESSMENT  04/27/2024     DEXA  03/02/2026     ADVANCE CARE PLANNING  04/27/2028     PHQ-2 (once per calendar year)  Completed     Pneumococcal Vaccine: 65+ Years  Completed     IPV IMMUNIZATION  Aged Out     MENINGITIS IMMUNIZATION  Aged Out       Immunization History   Administered Date(s) Administered     COVID-19 Vaccine 12+ (Pfizer 2022) 05/17/2022     COVID-19 Vaccine 12+ (Pfizer) 02/02/2021, 02/23/2021, 10/15/2021     FLUAD(HD)65+ QUAD 10/01/2020     Influenza (High Dose) 3 valent vaccine 10/01/2014, 10/01/2016, 10/01/2018, 10/08/2019, 10/01/2021     Influenza (IIV3) PF  "11/30/2005, 09/24/2009     Pneumo Conj 13-V (2010&after) 10/12/2016     Pneumococcal 23 valent 05/01/2002, 05/23/2007     TD,PF 7+ (Tenivac) 05/01/2002, 10/16/2018     Zoster vaccine, live 08/05/2009       Pertinent mammograms are reviewed under the imaging tab.    Review of Systems   Constitutional: Negative for chills and fever.   HENT: Negative for congestion, ear pain, hearing loss and sore throat.    Eyes: Negative for pain and visual disturbance.   Respiratory: Negative for cough and shortness of breath.    Cardiovascular: Negative for chest pain, palpitations and peripheral edema.   Gastrointestinal: Negative for abdominal pain, constipation, diarrhea, heartburn, hematochezia and nausea.   Breasts:  Negative for tenderness, breast mass and discharge.   Genitourinary: Negative for dysuria, frequency, genital sores, hematuria, pelvic pain, urgency, vaginal bleeding and vaginal discharge.   Musculoskeletal: Negative for arthralgias, joint swelling and myalgias.   Skin: Negative for rash.   Neurological: Negative for dizziness, weakness, headaches and paresthesias.   Psychiatric/Behavioral: Negative for mood changes. The patient is not nervous/anxious.        OBJECTIVE:   /72   Pulse 79   Temp 97.7  F (36.5  C) (Temporal)   Resp 13   Ht 1.651 m (5' 5\")   Wt 45.3 kg (99 lb 14.4 oz)   SpO2 99%   BMI 16.62 kg/m   Estimated body mass index is 16.62 kg/m  as calculated from the following:    Height as of this encounter: 1.651 m (5' 5\").    Weight as of this encounter: 45.3 kg (99 lb 14.4 oz).     Physical Exam  GENERAL: healthy, alert and no distress  EYES: Eyes grossly normal to inspection, PERRL and conjunctivae and sclerae normal  HENT: ear canals and TM's normal, nose and mouth without ulcers or lesions  NECK: no adenopathy, no asymmetry, masses, or scars and thyroid normal to palpation  RESP: lungs clear to auscultation - no rales, rhonchi or wheezes  CV: regular rate and rhythm, normal S1 S2, no S3 " or S4, no click or rub  MS: no gross musculoskeletal defects noted, no edema  NEURO: No focal changes  PSYCH: mentation appears normal, affect normal/bright    ASSESSMENT / PLAN:   (Z00.00) Encounter for subsequent annual wellness visit in Medicare patient  (primary encounter diagnosis)  Comment: Patient was advised to consider updating vaccinations accordingly.  She did not like the fact that she had to pay $40 for her last tetanus booster.  She will consider such through her pharmacy.  Plan: CBC with platelets, Comprehensive metabolic         panel, Lipid Profile        Future lab orders placed.    (Z13.6) CARDIOVASCULAR SCREENING; LDL GOAL LESS THAN 160  Comment: Labs ordered as fasting per routine.  Plan: Lipid Profile            (Z23) Need for shingles vaccine  Comment: Advised and given information on updated shingles vaccine  Plan:     (Z23) Need for diphtheria-tetanus-pertussis (Tdap) vaccine  Comment: Advised and given information on updated tetanus booster  Plan:     (G47.00) Insomnia, unspecified type  Comment: Refilled for patient's use on an as-needed basis nightly.  Plan: amitriptyline (ELAVIL) 25 MG tablet,         OFFICE/OUTPT VISIT,EST,LEVL III            (M81.0) Osteoporosis, unspecified osteoporosis type, unspecified pathological fracture presence  Comment: Discussed and reviewed recent bone density scan and benefit of medication.  Meds refilled.  Continue with calcium and vitamin D  Plan: alendronate (FOSAMAX) 70 MG tablet,         OFFICE/OUTPT VISIT,EST,LEVL III            Patient has been advised of split billing requirements and indicates understanding: Yes      COUNSELING:  Reviewed preventive health counseling, as reflected in patient instructions       Regular exercise       Healthy diet/nutrition        She reports that she has never smoked. She has never used smokeless tobacco.      Appropriate preventive services were discussed with this patient, including applicable screening as  appropriate for cardiovascular disease, diabetes, osteopenia/osteoporosis, and glaucoma.  As appropriate for age/gender, discussed screening for colorectal cancer, prostate cancer, breast cancer, and cervical cancer. Checklist reviewing preventive services available has been given to the patient.    Reviewed patients plan of care and provided an AVS. The Basic Care Plan (routine screening as documented in Health Maintenance) for Courtney meets the Care Plan requirement. This Care Plan has been established and reviewed with the Patient.      Bassem Howe MD  [facility]    Identified Health Risks:      She is at risk for lack of exercise and has been provided with information to increase physical activity for the benefit of her well-being.  The patient was provided with suggestions to help her develop a healthy emotional lifestyle.

## 2023-04-27 NOTE — PATIENT INSTRUCTIONS
Patient Education   Personalized Prevention Plan  You are due for the preventive services outlined below.  Your care team is available to assist you in scheduling these services.  If you have already completed any of these items, please share that information with your care team to update in your medical record.  Health Maintenance Due   Topic Date Due     Zoster (Shingles) Vaccine (2 of 3) 09/30/2009     Diptheria Tetanus Pertussis (DTAP/TDAP/TD) Vaccine (1 - Tdap) 10/17/2018     COVID-19 Vaccine (5 - Booster for Pfizer series) 07/12/2022     Flu Vaccine (1) 09/01/2022     ANNUAL REVIEW OF HM ORDERS  11/09/2022       Exercise for a Healthier Heart  You may wonder how you can improve the health of your heart. If you re thinking about exercise, you re on the right track. You don t need to become an athlete. But you do need a certain amount of brisk exercise to help strengthen your heart. If you have been diagnosed with a heart condition, your healthcare provider may advise exercise to help your condition. To help make exercise a habit, choose safe, fun activities.      Exercise with a friend. When activity is fun, you're more likely to stick with it.     Before you start  Check with your healthcare provider before starting an exercise program. This is especially important if you haven't been active for a while. It's also important if you have a long-term (chronic) health problem such as heart disease, diabetes, or obesity. Also check with your provider if you're at high risk for having these problems.   Why exercise?  Exercising regularly offers many healthy rewards. It can help you do all of these:     Improve your blood cholesterol level to help prevent further heart trouble.    Lower your blood pressure to help prevent a stroke or heart attack.    Control diabetes or reduce your risk of getting this disease.    Improve your heart and lung function.    Reach and stay at a healthy weight.    Make your muscles  stronger so you can stay active.    Prevent falls and fractures by slowing the loss of bone mass (osteoporosis).    Manage stress better.    Improve your sense of self and your body image.  Exercise tips      Ease into your routine. Set small goals. Then build on them. Talk with your healthcare provider first before starting an exercise routine if you're not sure what your activity level should be.    Exercise on most days. Aim for a total of at least 150 minutes (2 hours and 30 minutes) or more of moderate-intensity aerobic activity each week. You could also do 75 minutes (1 hour and 15 minutes) or more of vigorous-intensity aerobic activity each week. Or try for a combination of both. Moderate activity means that you breathe heavier and your heart rate increases, but you can still talk. Think about doing at least 30 minutes of moderate exercise, 5 times a week. It's OK to work up to the 30-minute period over time. Examples of moderate-intensity activity are brisk walking, gardening, and water aerobics.    Step up your daily activity level.  Along with your exercise program, try being more active the whole day. Walk instead of drive. Or park further away so that you take more steps each day. Do more household tasks or yard work. You may not be able to meet the advised amount of physical activity. But doing some moderate- or vigorous-intensity aerobic activity can help reduce your risk for heart disease. Your healthcare provider can help you figure out what is best for you.    Choose 1 or more activities you enjoy.  Walking is one of the easiest things you can do. You can also try swimming, riding a bike, dancing, or taking an exercise class.    Call 911  Call 911 right away if any of these occur:     Chest pain that doesn't go away quickly with rest    New burning, tightness, pressure, or heaviness in your chest, neck, shoulders, back, or arms    Abnormal or severe shortness of breath    A very fast or irregular  heartbeat (palpitations)    Fainting  When to call your healthcare provider  Call your healthcare provider if you have any of these:     Dizziness or lightheadedness    Mild shortness of breath or chest pain    Increased or new joint or muscle pain    [email or web address] last reviewed this educational content on 7/1/2022 2000-2022 [employer]. All rights reserved. This information is not intended as a substitute for professional medical care. Always follow your healthcare professional's instructions.        Your Health Risk Assessment indicates you feel you are not in good emotional health.    Recreation   Recreation is not limited to sports and team events. It includes any activity that provides relaxation, interest, enjoyment, and exercise. Recreation provides an outlet for physical, mental, and social energy. It can give a sense of worth and achievement. It can help you stay healthy.    Mental Exercise and Social Involvement  Mental and emotional health is as important as physical health. Keep in touch with friends and family. Stay as active as possible. Continue to learn and challenge yourself.   Things you can do to stay mentally active are:    Learn something new, like a foreign language or musical instrument.     Play SCRABBLE or do crossword puzzles. If you cannot find people to play these games with you at home, you can play them with others on your computer through the Internet.     Join a games club--anything from card games to chess or checkers or lawn bowling.     Start a new hobby.     Go back to school.     Volunteer.     Read.   Keep up with world events.

## 2023-04-28 LAB
PATH REPORT.COMMENTS IMP SPEC: NORMAL
PATH REPORT.COMMENTS IMP SPEC: NORMAL
PATH REPORT.FINAL DX SPEC: NORMAL
PATH REPORT.GROSS SPEC: NORMAL
PATH REPORT.MICROSCOPIC SPEC OTHER STN: NORMAL
PATH REPORT.RELEVANT HX SPEC: NORMAL

## 2023-04-29 ENCOUNTER — DOCUMENTATION ONLY (OUTPATIENT)
Dept: DERMATOLOGY | Facility: CLINIC | Age: 88
End: 2023-04-29
Payer: COMMERCIAL

## 2023-04-29 ENCOUNTER — NURSE TRIAGE (OUTPATIENT)
Dept: NURSING | Facility: CLINIC | Age: 88
End: 2023-04-29
Payer: COMMERCIAL

## 2023-04-30 NOTE — TELEPHONE ENCOUNTER
Nurse Triage SBAR    Is this a 2nd Level Triage? YES, LICENSED PRACTITIONER REVIEW IS REQUIRED    Situation: Pt called with concerns for an infection.    Background: Courtney was seen on Monday and had a cyst removed from her foot.    Assessment: Courtney states her foot was itching this evening so she looked down and saw that the area that was cut is red, swollen, and has pus coming out of it. It is open and raw and about the size between a moreno and a dime. She denies bleeding, fever, and severe pain.    Protocol Recommended Disposition:   See HCP Within 4 Hours (Or PCP Triage)    Recommendation: Dispo for second level triage. On call provider paged who stated that pt needs to be evaluated in person. If she feels it is an emergency to go to the ER otherwise UC tomorrow or clinic on Monday. This information was relayed to the pt.    Reason for Disposition    [1] Pus or bad-smelling fluid draining from incision AND [2] no fever    Additional Information    Negative: [1] Major abdominal surgical incision AND [2] wound gaping open AND [3] visible internal organs    Negative: Sounds like a life-threatening emergency to the triager    Negative: [1] Bleeding from incision AND [2] won't stop after 10 minutes of direct pressure    Negative: [1] Bleeding (more than a few drops) from incision AND [2] blood vessel surgery (e.g., carotid endarterectomy, femoral bypass graft, kidney dialysis fistula, tracheostomy)    Negative: [1] Widespread rash AND [2] bright red, sunburn-like    Negative: Patient sounds very sick or weak to the triager    Negative: Sounds like a serious complication to the triager    Negative: Severe pain in the incision    Negative: [1] Incision gaping open AND [2] < 48 hours since wound re-opened    Negative: [1] Incision gaping open AND [2] length of opening > 2 inches (5 cm)    Negative: Fever > 100.4 F (38.0 C)    Negative: [1] Incision looks infected (spreading redness, pain) AND [2] fever > 99.5 F (37.5  C)    Negative: [1] Incision looks infected (spreading redness, pain) AND [2] large red area (> 2 in. or 5 cm)    Negative: [1] Incision looks infected (spreading redness, pain) AND [2] face wound    Protocols used: POST-OP INCISION SYMPTOMS AND [id number]    Yelena Hess RN  [facility] Nurse Advisor   4/29/2023  9:50 PM

## 2023-04-30 NOTE — PROGRESS NOTES
Paged by nurse triage line @ [phone] regarding Courtney's concerns per documentation in nurse triage line from 4/29/23. Relayed information that if patient is concerned for infection we recommend medical evaluation at her earliest convenience as would need clinical evaluation and likely bacterial swab to confirm infection prior to prescribing antibiotics. Will send message to dermatology nurses to follow up with patient Monday AM to see how her concerns are doing.    Issa Dudley MD

## 2023-05-02 ENCOUNTER — TELEPHONE (OUTPATIENT)
Dept: DERMATOLOGY | Facility: CLINIC | Age: 88
End: 2023-05-02
Payer: COMMERCIAL

## 2023-05-02 NOTE — TELEPHONE ENCOUNTER
----- Message from Don Feliciano MD sent at 4/28/2023  5:41 PM CDT -----  Pt informed via [employer]. No further action necessary.     The biopsy came back consistent with the benign entity we talked about in the room, where there is a connection between the joint fluid and the skin. This is called a digital myxoid cyst. It classically presents on the fingers but can occur where yours was too. Sometimes they will recur, sometimes they wont. To have it definitively treatment, I could refer you to a foot surgeon, and he can cut the connection between the joint space and the skin. Let me know if you are interested in that or if you want to observe it for now.

## 2023-05-02 NOTE — TELEPHONE ENCOUNTER
Pathologic Diagnosis   Skin, left mid frontal scalp, shave biopsy:   -Pigmented seborrheic keratosis  -Pigmented actinic keratosis   Left a message for patient to return call to our office regarding biopsy results.   Zuffle message sent 5/2/23    Jaclyn FLORES RN  Dermatology   [phone]

## 2023-05-03 NOTE — TELEPHONE ENCOUNTER
Called and spoke with pt and discussed below results. Pt voiced understanding and for now does not want referral to foot surgeon, but will reach out in the future if she changes her mind.    Thank you,  Jaclyn FLORES RN  Dermatology   [phone]

## 2023-05-05 ENCOUNTER — LAB (OUTPATIENT)
Dept: LAB | Facility: CLINIC | Age: 88
End: 2023-05-05
Payer: COMMERCIAL

## 2023-05-05 DIAGNOSIS — Z13.6 CARDIOVASCULAR SCREENING; LDL GOAL LESS THAN 160: ICD-10-CM

## 2023-05-05 DIAGNOSIS — Z00.00 ENCOUNTER FOR SUBSEQUENT ANNUAL WELLNESS VISIT IN MEDICARE PATIENT: ICD-10-CM

## 2023-05-05 LAB
ALBUMIN SERPL BCG-MCNC: 3.6 G/DL (ref 3.5–5.2)
ALP SERPL-CCNC: 84 U/L (ref 35–104)
ALT SERPL W P-5'-P-CCNC: 21 U/L (ref 10–35)
ANION GAP SERPL CALCULATED.3IONS-SCNC: 8 MMOL/L (ref 7–15)
AST SERPL W P-5'-P-CCNC: 42 U/L (ref 10–35)
BILIRUB SERPL-MCNC: 0.3 MG/DL
BUN SERPL-MCNC: 6.7 MG/DL (ref 8–23)
CALCIUM SERPL-MCNC: 8.9 MG/DL (ref 8.8–10.2)
CHLORIDE SERPL-SCNC: 104 MMOL/L (ref 98–107)
CHOLEST SERPL-MCNC: 156 MG/DL
CREAT SERPL-MCNC: 0.49 MG/DL (ref 0.51–0.95)
DEPRECATED HCO3 PLAS-SCNC: 26 MMOL/L (ref 22–29)
ERYTHROCYTE [DISTWIDTH] IN BLOOD BY AUTOMATED COUNT: 14.9 % (ref 10–15)
GFR SERPL CREATININE-BSD FRML MDRD: 90 ML/MIN/1.73M2
GLUCOSE SERPL-MCNC: 92 MG/DL (ref 70–99)
HCT VFR BLD AUTO: 40.7 % (ref 35–47)
HDLC SERPL-MCNC: 39 MG/DL
HGB BLD-MCNC: 12.7 G/DL (ref 11.7–15.7)
LDLC SERPL CALC-MCNC: 88 MG/DL
MCH RBC QN AUTO: 30.5 PG (ref 26.5–33)
MCHC RBC AUTO-ENTMCNC: 31.2 G/DL (ref 31.5–36.5)
MCV RBC AUTO: 98 FL (ref 78–100)
NONHDLC SERPL-MCNC: 117 MG/DL
PLATELET # BLD AUTO: 379 10E3/UL (ref 150–450)
POTASSIUM SERPL-SCNC: 4.5 MMOL/L (ref 3.4–5.3)
PROT SERPL-MCNC: 7 G/DL (ref 6.4–8.3)
RBC # BLD AUTO: 4.17 10E6/UL (ref 3.8–5.2)
SODIUM SERPL-SCNC: 138 MMOL/L (ref 136–145)
TRIGL SERPL-MCNC: 146 MG/DL
WBC # BLD AUTO: 5.3 10E3/UL (ref 4–11)

## 2023-05-05 PROCEDURE — 85027 COMPLETE CBC AUTOMATED: CPT

## 2023-05-05 PROCEDURE — 80053 COMPREHEN METABOLIC PANEL: CPT

## 2023-05-05 PROCEDURE — 36415 COLL VENOUS BLD VENIPUNCTURE: CPT

## 2023-05-05 PROCEDURE — 80061 LIPID PANEL: CPT

## 2024-03-28 ENCOUNTER — PATIENT OUTREACH (OUTPATIENT)
Dept: CARE COORDINATION | Facility: CLINIC | Age: 89
End: 2024-03-28
Payer: COMMERCIAL

## 2024-04-16 DIAGNOSIS — M81.0 OSTEOPOROSIS, UNSPECIFIED OSTEOPOROSIS TYPE, UNSPECIFIED PATHOLOGICAL FRACTURE PRESENCE: ICD-10-CM

## 2024-04-16 RX ORDER — ALENDRONATE SODIUM 70 MG/1
70 TABLET ORAL
Qty: 12 TABLET | Refills: 0 | OUTPATIENT
Start: 2024-04-16

## 2024-04-20 DIAGNOSIS — M81.0 OSTEOPOROSIS, UNSPECIFIED OSTEOPOROSIS TYPE, UNSPECIFIED PATHOLOGICAL FRACTURE PRESENCE: ICD-10-CM

## 2024-04-21 ENCOUNTER — MYC REFILL (OUTPATIENT)
Dept: INTERNAL MEDICINE | Facility: CLINIC | Age: 89
End: 2024-04-21
Payer: COMMERCIAL

## 2024-04-21 DIAGNOSIS — M81.0 OSTEOPOROSIS, UNSPECIFIED OSTEOPOROSIS TYPE, UNSPECIFIED PATHOLOGICAL FRACTURE PRESENCE: ICD-10-CM

## 2024-04-22 RX ORDER — ALENDRONATE SODIUM 70 MG/1
70 TABLET ORAL
Qty: 12 TABLET | Refills: 0 | OUTPATIENT
Start: 2024-04-22

## 2024-04-22 RX ORDER — ALENDRONATE SODIUM 70 MG/1
70 TABLET ORAL
Qty: 12 TABLET | Refills: 0 | Status: SHIPPED | OUTPATIENT
Start: 2024-04-22 | End: 2024-05-16

## 2024-05-16 ENCOUNTER — OFFICE VISIT (OUTPATIENT)
Dept: INTERNAL MEDICINE | Facility: CLINIC | Age: 89
End: 2024-05-16
Attending: INTERNAL MEDICINE
Payer: COMMERCIAL

## 2024-05-16 VITALS
HEIGHT: 65 IN | BODY MASS INDEX: 15.76 KG/M2 | OXYGEN SATURATION: 97 % | WEIGHT: 94.6 LBS | SYSTOLIC BLOOD PRESSURE: 136 MMHG | RESPIRATION RATE: 16 BRPM | HEART RATE: 75 BPM | TEMPERATURE: 97.5 F | DIASTOLIC BLOOD PRESSURE: 82 MMHG

## 2024-05-16 DIAGNOSIS — M25.559 HIP PAIN, UNSPECIFIED LATERALITY: ICD-10-CM

## 2024-05-16 DIAGNOSIS — M81.0 OSTEOPOROSIS, UNSPECIFIED OSTEOPOROSIS TYPE, UNSPECIFIED PATHOLOGICAL FRACTURE PRESENCE: ICD-10-CM

## 2024-05-16 DIAGNOSIS — G47.00 INSOMNIA, UNSPECIFIED TYPE: ICD-10-CM

## 2024-05-16 DIAGNOSIS — Z00.00 ENCOUNTER FOR SUBSEQUENT ANNUAL WELLNESS VISIT (AWV) IN MEDICARE PATIENT: Primary | ICD-10-CM

## 2024-05-16 DIAGNOSIS — Z13.6 CARDIOVASCULAR SCREENING; LDL GOAL LESS THAN 160: ICD-10-CM

## 2024-05-16 PROCEDURE — G0439 PPPS, SUBSEQ VISIT: HCPCS | Performed by: INTERNAL MEDICINE

## 2024-05-16 PROCEDURE — 90480 ADMN SARSCOV2 VAC 1/ONLY CMP: CPT | Performed by: INTERNAL MEDICINE

## 2024-05-16 PROCEDURE — 99213 OFFICE O/P EST LOW 20 MIN: CPT | Mod: 25 | Performed by: INTERNAL MEDICINE

## 2024-05-16 PROCEDURE — 91320 SARSCV2 VAC 30MCG TRS-SUC IM: CPT | Performed by: INTERNAL MEDICINE

## 2024-05-16 RX ORDER — ALENDRONATE SODIUM 70 MG/1
70 TABLET ORAL
Qty: 12 TABLET | Refills: 3 | Status: SHIPPED | OUTPATIENT
Start: 2024-05-16

## 2024-05-16 SDOH — HEALTH STABILITY: PHYSICAL HEALTH: ON AVERAGE, HOW MANY DAYS PER WEEK DO YOU ENGAGE IN MODERATE TO STRENUOUS EXERCISE (LIKE A BRISK WALK)?: 0 DAYS

## 2024-05-16 ASSESSMENT — SOCIAL DETERMINANTS OF HEALTH (SDOH): HOW OFTEN DO YOU GET TOGETHER WITH FRIENDS OR RELATIVES?: TWICE A WEEK

## 2024-05-16 NOTE — PROGRESS NOTES
Preventive Care Visit  [facility]  Bassem Howe MD, Internal Medicine  May 16, 2024  {Provider  Link to SmartSet :182418}    {PROVIDER CHARTING PREFERENCE:284514}    Sarah Valdez is a 89 year old, presenting for the following:  No chief complaint on file.    {(!) Visit Details have not yet been documented.  Please enter Visit Details and then use this list to pull in documentation. (Optional):079072}  {ROOMER if patient is in their first year of Medicare a vision screen is required click here to document the Vison screen and then refresh the note to pull in results  :060338}    Health Care Directive  Patient does not have a Health Care Directive or Living Will: {ADVANCE_DIRECTIVE_STATUS:552800}    HPI  ***  {MA/LPN/RN Pre-Provider Visit Orders- hCG/UA/Strep (Optional):026037}  {SUPERLIST (Optional):126573}  {additonal problems for provider to add (Optional):947715}      4/27/2023   General Health   How would you rate your overall physical health? Good         4/27/2023   Nutrition   At least 4 servings of fruits and vegetables/day Yes         4/27/2023   Exercise   Frequency of exercise: None         11/9/2021   Social Factors   Frequency of gathering with friends or relatives Three times a week         4/27/2023   Activities of Daily Living- Home Safety   Needs help with the following daily activites NO assistance is needed   Safety concerns in the home None of the above          No data to display                  4/27/2023   Hearing Screening   Hearing concerns? No concerns            No data to display                   {Rooming Staff Patient needs a PHQ as part of the AWV.  Use this link to complete and then refresh the note to pull results Link to PHQ2 Assessment :841869}  {USE TO PULL IN PHQ RESULTS FOR TODAY:653301}      4/27/2023   Substance Use   Alcohol more than 3/day or more than 7/wk No     Social History     Tobacco Use    Smoking status: Never    Smokeless tobacco:  Never   Substance Use Topics    Alcohol use: Yes     Comment: social    Drug use: No     {Provider  If there are gaps in the social history shown above, please follow the link to update and then refresh the note Link to Social and Substance History :224390}     {Mammogram Decision Support (Optional):699187}      {Provider  Use the storyboard to review patient history, after sections have been marked as reviewed, refresh note to capture documentation:282810}  {Provider   REQUIRED AWV use this link to review and update sexual activity history  after section has been marked as reviewed, refresh note to capture documentation:661201}  Reviewed and updated as needed this visit by Provider                    {HISTORY OPTIONS (Optional):159492}  Current providers sharing in care for this patient include:  Patient Care Team:  Bassem Howe MD as PCP - General  Bassem Howe MD as Assigned PCP  Sravanthi Mclean RN as   Don Feliciano MD as MD (Dermatology)  Don Feliciano MD as Assigned Surgical Provider    The following health maintenance items are reviewed in Epic and correct as of today:  Health Maintenance   Topic Date Due    RSV VACCINE (Pregnancy & 60+) (1 - 1-dose 60+ series) Never done    ZOSTER IMMUNIZATION (2 of 3) 09/30/2009    DTAP/TDAP/TD IMMUNIZATION (1 - Tdap) 10/17/2018    ANNUAL REVIEW OF HM ORDERS  11/09/2022    COVID-19 Vaccine (6 - 2023-24 season) 09/01/2023    PHQ-2 (once per calendar year)  01/01/2024    FALL RISK ASSESSMENT  04/27/2024    MEDICARE ANNUAL WELLNESS VISIT  04/27/2024    DEXA  03/02/2026    ADVANCE CARE PLANNING  04/27/2028    INFLUENZA VACCINE  Completed    Pneumococcal Vaccine: 65+ Years  Completed    IPV IMMUNIZATION  Aged Out    HPV IMMUNIZATION  Aged Out    MENINGITIS IMMUNIZATION  Aged Out    RSV MONOCLONAL ANTIBODY  Aged Out       {ROS Picklists (Optional):526983}     Objective    Exam  There were no vitals taken for this visit.   Estimated body mass index is  "16.62 kg/m  as calculated from the following:    Height as of 4/27/23: 1.651 m (5' 5\").    Weight as of 4/27/23: 45.3 kg (99 lb 14.4 oz).    Physical Exam  {Exam Choices (Optional):341170}  {Provider  The Mini-Cog is incomplete, use link to complete and refresh note Link to Mini-Cog :015570}       No data to display              {A Mini-Cog total score of 0-2 suggests the possibility of dementia, score of 3-5 suggests no dementia:223908}         Signed Electronically by: Bassem Howe MD  {Email feedback regarding this note to [email or web address]   :865304}  "

## 2024-05-16 NOTE — PROGRESS NOTES
Preventive Care Visit  [facility]  Bassem Howe MD, Internal Medicine  May 16, 2024      Assessment & Plan     Encounter for subsequent annual wellness visit (AWV) in Medicare patient  Patient interested in COVID-vaccine only.  Discussed RSV, shingles and tetanus booster.  Labs ordered as fasting per routine  - Comprehensive metabolic panel; Future  - CBC with platelets; Future  - Lipid Profile; Future    CARDIOVASCULAR SCREENING; LDL GOAL LESS THAN 160  Labs ordered as routine  - Lipid Profile; Future    Hip pain, unspecified laterality  Suspect related to musculoskeletal tendinous injury, resolving.  No further assessment needed      Insomnia, unspecified type  Refilled for as needed use.  - amitriptyline (ELAVIL) 25 MG tablet; Take 1 tablet (25 mg) by mouth at bedtime  - OFFICE/OUTPT VISIT,EST,LEVL III    Osteoporosis, unspecified osteoporosis type, unspecified pathological fracture presence  Continue with Fosamax as directed, high risk for bone density loss  - alendronate (FOSAMAX) 70 MG tablet; Take 1 tablet (70 mg) by mouth every 7 days  - OFFICE/OUTPT VISIT,EST,LEVL III    Patient has been advised of split billing requirements and indicates understanding: Yes        Counseling  Appropriate preventive services were discussed with this patient, including applicable screening as appropriate for fall prevention, nutrition, physical activity, Tobacco-use cessation, weight loss and cognition.  Checklist reviewing preventive services available has been given to the patient.  Reviewed patient's diet, addressing concerns and/or questions.   Discussed possible causes of fatigue.     Regular exercise  See Patient Instructions    Subjective   Courtney is a 89 year old, presenting for the following:  Wellness Visit        Health Care Directive  Patient does not have a Health Care Directive or Living Will: Patient states has Advance Directive and will bring in a copy to clinic.    HPI    Left groin  pain x 2-3 months. Was also experiencing left hip pain but that has now resolved.         5/16/2024   General Health   How would you rate your overall physical health? Good   Feel stress (tense, anxious, or unable to sleep) Very much   (!) STRESS CONCERN      5/16/2024   Nutrition   Diet: Gluten-free/reduced         5/16/2024   Exercise   Days per week of moderate/strenous exercise 0 days   (!) EXERCISE CONCERN      5/16/2024   Social Factors   Frequency of gathering with friends or relatives Twice a week   Worry food won't last until get money to buy more No   Food not last or not have enough money for food? No   Do you have housing?  Yes   Are you worried about losing your housing? No   Lack of transportation? Patient declined   Unable to get utilities (heat,electricity)? No         5/16/2024   Fall Risk   Fallen 2 or more times in the past year? No    No   Trouble with walking or balance? No    No          5/16/2024   Activities of Daily Living- Home Safety   Needs help with the following daily activites None of the above   Safety concerns in the home None of the above         5/16/2024   Dental   Dentist two times every year? Yes         5/16/2024   Hearing Screening   Hearing concerns? None of the above         5/16/2024   Driving Risk Screening   Patient/family members have concerns about driving No         5/16/2024   General Alertness/Fatigue Screening   Have you been more tired than usual lately? (!) YES         5/16/2024   Urinary Incontinence Screening   Bothered by leaking urine in past 6 months No         5/16/2024   TB Screening   Were you born outside of the [place]? No         Today's PHQ-2 Score:       5/16/2024     1:17 PM   PHQ-2 ( 1999 Pfizer)   Q1: Little interest or pleasure in doing things 0   Q2: Feeling down, depressed or hopeless 1   PHQ-2 Score 1   Q1: Little interest or pleasure in doing things Not at all   Q2: Feeling down, depressed or hopeless Several days   PHQ-2 Score 1            5/16/2024   Substance Use   Alcohol more than 3/day or more than 7/wk No   Do you have a current opioid prescription? No   How severe/bad is pain from 1 to 10? 0/10 (No Pain)   Do you use any other substances recreationally? No     Social History     Tobacco Use    Smoking status: Never    Smokeless tobacco: Never   Vaping Use    Vaping status: Never Used   Substance Use Topics    Alcohol use: Yes     Comment: social    Drug use: No     Mammogram Screening - After age 74- determine frequency with patient based on health status, life expectancy and patient goals      Reviewed and updated as needed this visit by Provider                    Lab work is in process  Current providers sharing in care for this patient include:  Patient Care Team:  Bassem Howe MD as PCP - General  Bassem Howe MD as Assigned PCP  Sravanthi Mclean RN as   Don Feliciano MD as MD (Dermatology)  Don Feliciano MD as Assigned Surgical Provider    The following health maintenance items are reviewed in Epic and correct as of today:  Health Maintenance   Topic Date Due    RSV VACCINE (Pregnancy & 60+) (1 - 1-dose 60+ series) Never done    ZOSTER IMMUNIZATION (2 of 3) 09/30/2009    DTAP/TDAP/TD IMMUNIZATION (1 - Tdap) 10/17/2018    ANNUAL REVIEW OF HM ORDERS  11/09/2022    COVID-19 Vaccine (6 - 2023-24 season) 09/01/2023    MEDICARE ANNUAL WELLNESS VISIT  04/27/2024    FALL RISK ASSESSMENT  05/16/2025    DEXA  03/02/2026    ADVANCE CARE PLANNING  04/27/2028    PHQ-2 (once per calendar year)  Completed    INFLUENZA VACCINE  Completed    Pneumococcal Vaccine: 65+ Years  Completed    IPV IMMUNIZATION  Aged Out    HPV IMMUNIZATION  Aged Out    MENINGITIS IMMUNIZATION  Aged Out    RSV MONOCLONAL ANTIBODY  Aged Out         Review of Systems  CONSTITUTIONAL: NEGATIVE for fever, chills, change in weight  INTEGUMENTARY/SKIN: NEGATIVE for worrisome rashes, moles or lesions  EYES: NEGATIVE for vision changes or irritation  ENT/MOUTH:  "NEGATIVE for ear, mouth and throat problems  RESP: NEGATIVE for significant cough or SOB  CV: NEGATIVE for chest pain, palpitations or peripheral edema  GI: NEGATIVE for nausea, abdominal pain, heartburn, or change in bowel habits  : NEGATIVE for frequency, dysuria, or hematuria  MUSCULOSKELETAL: NEGATIVE for significant arthralgias or myalgia  NEURO: NEGATIVE for weakness, dizziness or paresthesias  ENDOCRINE: NEGATIVE for temperature intolerance, skin/hair changes  HEME: NEGATIVE for bleeding problems  PSYCHIATRIC: NEGATIVE for changes in mood or affect.  Discussed with patient the stressors in her life which have been centering around her  and his deterioration of health.  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  At the present time she does not want her states that she needs any help at home.     Objective    Exam  /82   Pulse 75   Temp 97.5  F (36.4  C) (Temporal)   Resp 16   Ht 1.651 m (5' 5\")   Wt 42.9 kg (94 lb 9.6 oz)   SpO2 97%   BMI 15.74 kg/m     Estimated body mass index is 15.74 kg/m  as calculated from the following:    Height as of this encounter: 1.651 m (5' 5\").    Weight as of this encounter: 42.9 kg (94 lb 9.6 oz).    Physical Exam  GENERAL: alert and no distress appears frail  EYES: Eyes grossly normal to inspection, PERRL and conjunctivae and sclerae normal  HENT: ear canals and TM's normal, nose and mouth without ulcers or lesions  NECK: no adenopathy, no asymmetry, masses, or scars  RESP: lungs clear to auscultation - no rales, rhonchi or wheezes  CV: regular rate and rhythm, normal S1 S2, no S3 or S4, no murmur, click or rub, no peripheral edema  ABDOMEN: soft, nontender, no hepatosplenomegaly, no masses and bowel sounds normal  MS: no gross musculoskeletal defects noted, no edema  NEURO: No focal changes  PSYCH: mentation appears normal, affect normal/bright         Signed Electronically by: Bassem Howe MD    "

## 2024-06-28 ENCOUNTER — LAB (OUTPATIENT)
Dept: LAB | Facility: CLINIC | Age: 89
End: 2024-06-28
Payer: COMMERCIAL

## 2024-06-28 DIAGNOSIS — Z13.6 CARDIOVASCULAR SCREENING; LDL GOAL LESS THAN 160: ICD-10-CM

## 2024-06-28 DIAGNOSIS — Z00.00 ENCOUNTER FOR SUBSEQUENT ANNUAL WELLNESS VISIT (AWV) IN MEDICARE PATIENT: ICD-10-CM

## 2024-06-28 LAB
ALBUMIN SERPL BCG-MCNC: 3.6 G/DL (ref 3.5–5.2)
ALP SERPL-CCNC: 78 U/L (ref 40–150)
ALT SERPL W P-5'-P-CCNC: 24 U/L (ref 0–50)
ANION GAP SERPL CALCULATED.3IONS-SCNC: 7 MMOL/L (ref 7–15)
AST SERPL W P-5'-P-CCNC: 39 U/L (ref 0–45)
BILIRUB SERPL-MCNC: 0.6 MG/DL
BUN SERPL-MCNC: 7.7 MG/DL (ref 8–23)
CALCIUM SERPL-MCNC: 8.8 MG/DL (ref 8.8–10.2)
CHLORIDE SERPL-SCNC: 106 MMOL/L (ref 98–107)
CHOLEST SERPL-MCNC: 156 MG/DL
CREAT SERPL-MCNC: 0.47 MG/DL (ref 0.51–0.95)
DEPRECATED HCO3 PLAS-SCNC: 28 MMOL/L (ref 22–29)
EGFRCR SERPLBLD CKD-EPI 2021: 90 ML/MIN/1.73M2
ERYTHROCYTE [DISTWIDTH] IN BLOOD BY AUTOMATED COUNT: 14.3 % (ref 10–15)
FASTING STATUS PATIENT QL REPORTED: YES
FASTING STATUS PATIENT QL REPORTED: YES
GLUCOSE SERPL-MCNC: 90 MG/DL (ref 70–99)
HCT VFR BLD AUTO: 41 % (ref 35–47)
HDLC SERPL-MCNC: 40 MG/DL
HGB BLD-MCNC: 13 G/DL (ref 11.7–15.7)
LDLC SERPL CALC-MCNC: 85 MG/DL
MCH RBC QN AUTO: 31.3 PG (ref 26.5–33)
MCHC RBC AUTO-ENTMCNC: 31.7 G/DL (ref 31.5–36.5)
MCV RBC AUTO: 99 FL (ref 78–100)
NONHDLC SERPL-MCNC: 116 MG/DL
PLATELET # BLD AUTO: 365 10E3/UL (ref 150–450)
POTASSIUM SERPL-SCNC: 4 MMOL/L (ref 3.4–5.3)
PROT SERPL-MCNC: 6.9 G/DL (ref 6.4–8.3)
RBC # BLD AUTO: 4.16 10E6/UL (ref 3.8–5.2)
SODIUM SERPL-SCNC: 141 MMOL/L (ref 135–145)
TRIGL SERPL-MCNC: 157 MG/DL
WBC # BLD AUTO: 5.1 10E3/UL (ref 4–11)

## 2024-06-28 PROCEDURE — 36415 COLL VENOUS BLD VENIPUNCTURE: CPT

## 2024-06-28 PROCEDURE — 80061 LIPID PANEL: CPT

## 2024-06-28 PROCEDURE — 80053 COMPREHEN METABOLIC PANEL: CPT

## 2024-06-28 PROCEDURE — 85027 COMPLETE CBC AUTOMATED: CPT | Mod: GZ

## 2024-10-14 ENCOUNTER — OFFICE VISIT (OUTPATIENT)
Dept: INTERNAL MEDICINE | Facility: CLINIC | Age: 89
End: 2024-10-14
Payer: COMMERCIAL

## 2024-10-14 VITALS
SYSTOLIC BLOOD PRESSURE: 154 MMHG | DIASTOLIC BLOOD PRESSURE: 76 MMHG | WEIGHT: 92.8 LBS | OXYGEN SATURATION: 97 % | BODY MASS INDEX: 15.44 KG/M2 | TEMPERATURE: 97.3 F | HEART RATE: 67 BPM

## 2024-10-14 DIAGNOSIS — R60.9 TRACE EDEMA: Primary | ICD-10-CM

## 2024-10-14 DIAGNOSIS — R35.0 INCREASED FREQUENCY OF URINATION: ICD-10-CM

## 2024-10-14 DIAGNOSIS — F41.9 ANXIETY: ICD-10-CM

## 2024-10-14 PROCEDURE — 99214 OFFICE O/P EST MOD 30 MIN: CPT | Performed by: PHYSICIAN ASSISTANT

## 2024-10-14 NOTE — PROGRESS NOTES
Assessment & Plan     Trace edema  Compression stocking, elevate feet reassurance given    Increased frequency of urination  Unable to void in clinic   Will notify if results when available  - UA with Microscopic reflex to Culture - lab collect; Future    Anxiety  Start selective serotonin reuptake inhibitor  Follow up with PCP in 4-6 weeks   - sertraline (ZOLOFT) 50 MG tablet; Take 0.5 tablets (25 mg) by mouth daily.          I spent a total of 30 minutes on the day of the visit.   Time spent by me doing chart review, history and exam, documentation and further activities per the note        Subjective   Courtney is a 89 year old, presenting for the following health issues:  Edema  In both lower legs and anckles for 2 weeks   Urination issues- fully emptying the bladder   Sent home a urine cup to collect UA   HPI     Abnormal Mood Symptoms    Duration: several weeks, more anxiety noted  Caring for her  at home.   Not sleeping well but this is an ongoing issue,  Tried trazodone ( did not help)   Currently on amitriptyline and it is not working well  Has tried melatonin too   Description:  Depression: no   Anxiety: YES  Panic attacks: no but issues with sleep at night   Accompanying signs and symptoms: see PHQ-9 and JACQUIE scores  History (similar episodes/previous evaluation): anxiety in the past   Precipitating or alleviating factors: None  Therapies tried and outcome: as above has not been on any medications for mood recently     URINARY TRACT SYMPTOMS    Duration: months  Description  frequency and hesitancy noticed more at night  Intensity:  mild  Accompanying signs and symptoms:  Fever/chills: no   Flank pain no   Nausea and vomiting: no   Vaginal symptoms: none  Abdominal/Pelvic Pain: no   History  History of frequent UTI's: no   History of kidney stones: no   Sexually Active: no   Possibility of pregnancy: No  Precipitating or alleviating factors: None  Therapies tried and outcome:    Outcome:      BIlateral lower leg edema     Duration: 2 weeks   Description (location/character/radiation): swelling noted at the ankles and feet   Intensity:  mild  Accompanying signs and symptoms: no SOB or chest pain, no leg pain   Some discomfort of feet due to swelling   History (similar episodes/previous evaluation): None  Precipitating or alleviating factors: None  Therapies tried and outcome: None                   Review of Systems  Constitutional, HEENT, cardiovascular, pulmonary, gi and gu systems are negative, except as otherwise noted.      Objective    BP (!) 154/76   Pulse 67   Temp 97.3  F (36.3  C) (Temporal)   Wt 42.1 kg (92 lb 12.8 oz)   SpO2 97%   BMI 15.44 kg/m    Body mass index is 15.44 kg/m .  Physical Exam   GENERAL: alert and no distress  RESP: lungs clear to auscultation - no rales, rhonchi or wheezes  CV: regular rates and rhythm, normal S1 S2, no S3 or S4, and mild trace edema bilateral ankles tops of feet   MS: extremities normal- no gross deformities noted            Signed Electronically by: Latia Moon PA-C

## 2024-10-15 LAB
ALBUMIN UR-MCNC: NEGATIVE MG/DL
APPEARANCE UR: CLEAR
BILIRUB UR QL STRIP: NEGATIVE
COLOR UR AUTO: YELLOW
GLUCOSE UR STRIP-MCNC: NEGATIVE MG/DL
HGB UR QL STRIP: NEGATIVE
KETONES UR STRIP-MCNC: NEGATIVE MG/DL
LEUKOCYTE ESTERASE UR QL STRIP: NEGATIVE
NITRATE UR QL: NEGATIVE
PH UR STRIP: 6.5 [PH] (ref 5–7)
RBC #/AREA URNS AUTO: NORMAL /HPF
SP GR UR STRIP: 1.01 (ref 1–1.03)
UROBILINOGEN UR STRIP-ACNC: 0.2 E.U./DL
WBC #/AREA URNS AUTO: NORMAL /HPF

## 2024-10-15 PROCEDURE — 81001 URINALYSIS AUTO W/SCOPE: CPT | Performed by: PHYSICIAN ASSISTANT

## 2025-04-16 ENCOUNTER — PATIENT OUTREACH (OUTPATIENT)
Dept: CARE COORDINATION | Facility: CLINIC | Age: OVER 89
End: 2025-04-16
Payer: COMMERCIAL

## 2025-04-30 ENCOUNTER — PATIENT OUTREACH (OUTPATIENT)
Dept: CARE COORDINATION | Facility: CLINIC | Age: OVER 89
End: 2025-04-30
Payer: COMMERCIAL

## 2025-07-06 ENCOUNTER — HEALTH MAINTENANCE LETTER (OUTPATIENT)
Age: OVER 89
End: 2025-07-06